# Patient Record
Sex: FEMALE | Race: WHITE | Employment: OTHER | URBAN - METROPOLITAN AREA
[De-identification: names, ages, dates, MRNs, and addresses within clinical notes are randomized per-mention and may not be internally consistent; named-entity substitution may affect disease eponyms.]

---

## 2017-01-09 ENCOUNTER — HOSPITAL ENCOUNTER (INPATIENT)
Age: 82
LOS: 4 days | Discharge: NURSING, REHAB OR LONG-TERM CARE | DRG: 069 | End: 2017-01-13
Attending: EMERGENCY MEDICINE | Admitting: INTERNAL MEDICINE
Payer: MEDICARE

## 2017-01-09 ENCOUNTER — APPOINTMENT (OUTPATIENT)
Dept: CT IMAGING | Age: 82
DRG: 069 | End: 2017-01-09
Attending: EMERGENCY MEDICINE
Payer: MEDICARE

## 2017-01-09 DIAGNOSIS — I63.9 CEREBROVASCULAR ACCIDENT (CVA), UNSPECIFIED MECHANISM (HCC): Primary | ICD-10-CM

## 2017-01-09 LAB
ANION GAP BLD CALC-SCNC: 3 MMOL/L (ref 3–18)
APTT PPP: 34.8 SEC (ref 23–36.4)
BASOPHILS # BLD AUTO: 0 K/UL (ref 0–0.06)
BASOPHILS # BLD: 1 % (ref 0–2)
BUN SERPL-MCNC: 21 MG/DL (ref 7–18)
BUN/CREAT SERPL: 17 (ref 12–20)
CALCIUM SERPL-MCNC: 9.3 MG/DL (ref 8.5–10.1)
CHLORIDE SERPL-SCNC: 104 MMOL/L (ref 100–108)
CK MB CFR SERPL CALC: 2.9 % (ref 0–4)
CK MB SERPL-MCNC: 6.6 NG/ML (ref 0.5–3.6)
CK SERPL-CCNC: 225 U/L (ref 26–192)
CO2 SERPL-SCNC: 36 MMOL/L (ref 21–32)
CREAT SERPL-MCNC: 1.21 MG/DL (ref 0.6–1.3)
DIFFERENTIAL METHOD BLD: ABNORMAL
EOSINOPHIL # BLD: 0.1 K/UL (ref 0–0.4)
EOSINOPHIL NFR BLD: 4 % (ref 0–5)
ERYTHROCYTE [DISTWIDTH] IN BLOOD BY AUTOMATED COUNT: 13.7 % (ref 11.6–14.5)
GLUCOSE SERPL-MCNC: 118 MG/DL (ref 74–99)
HCT VFR BLD AUTO: 36.4 % (ref 35–45)
HGB BLD-MCNC: 11.9 G/DL (ref 12–16)
INR PPP: 1 (ref 0.8–1.2)
LACTATE SERPL-SCNC: 0.9 MMOL/L (ref 0.4–2)
LYMPHOCYTES # BLD AUTO: 34 % (ref 21–52)
LYMPHOCYTES # BLD: 1.3 K/UL (ref 0.9–3.6)
MCH RBC QN AUTO: 31.9 PG (ref 24–34)
MCHC RBC AUTO-ENTMCNC: 32.7 G/DL (ref 31–37)
MCV RBC AUTO: 97.6 FL (ref 74–97)
MONOCYTES # BLD: 0.6 K/UL (ref 0.05–1.2)
MONOCYTES NFR BLD AUTO: 17 % (ref 3–10)
NEUTS SEG # BLD: 1.6 K/UL (ref 1.8–8)
NEUTS SEG NFR BLD AUTO: 44 % (ref 40–73)
PLATELET # BLD AUTO: 197 K/UL (ref 135–420)
PMV BLD AUTO: 10.5 FL (ref 9.2–11.8)
POTASSIUM SERPL-SCNC: 4 MMOL/L (ref 3.5–5.5)
PROTHROMBIN TIME: 12.5 SEC (ref 11.5–15.2)
RBC # BLD AUTO: 3.73 M/UL (ref 4.2–5.3)
SODIUM SERPL-SCNC: 143 MMOL/L (ref 136–145)
TROPONIN I SERPL-MCNC: 0.02 NG/ML (ref 0–0.06)
WBC # BLD AUTO: 3.7 K/UL (ref 4.6–13.2)

## 2017-01-09 PROCEDURE — 74011250637 HC RX REV CODE- 250/637: Performed by: EMERGENCY MEDICINE

## 2017-01-09 PROCEDURE — 80048 BASIC METABOLIC PNL TOTAL CA: CPT | Performed by: EMERGENCY MEDICINE

## 2017-01-09 PROCEDURE — 99285 EMERGENCY DEPT VISIT HI MDM: CPT

## 2017-01-09 PROCEDURE — 65660000000 HC RM CCU STEPDOWN

## 2017-01-09 PROCEDURE — 85730 THROMBOPLASTIN TIME PARTIAL: CPT | Performed by: EMERGENCY MEDICINE

## 2017-01-09 PROCEDURE — 83605 ASSAY OF LACTIC ACID: CPT | Performed by: EMERGENCY MEDICINE

## 2017-01-09 PROCEDURE — 85610 PROTHROMBIN TIME: CPT | Performed by: EMERGENCY MEDICINE

## 2017-01-09 PROCEDURE — 70450 CT HEAD/BRAIN W/O DYE: CPT

## 2017-01-09 PROCEDURE — 74011250636 HC RX REV CODE- 250/636: Performed by: EMERGENCY MEDICINE

## 2017-01-09 PROCEDURE — 85025 COMPLETE CBC W/AUTO DIFF WBC: CPT | Performed by: EMERGENCY MEDICINE

## 2017-01-09 PROCEDURE — 82550 ASSAY OF CK (CPK): CPT | Performed by: EMERGENCY MEDICINE

## 2017-01-09 PROCEDURE — 93005 ELECTROCARDIOGRAM TRACING: CPT

## 2017-01-09 RX ORDER — ASPIRIN 325 MG
325 TABLET ORAL
Status: COMPLETED | OUTPATIENT
Start: 2017-01-09 | End: 2017-01-09

## 2017-01-09 RX ORDER — SODIUM CHLORIDE 9 MG/ML
125 INJECTION, SOLUTION INTRAVENOUS ONCE
Status: COMPLETED | OUTPATIENT
Start: 2017-01-09 | End: 2017-01-12

## 2017-01-09 RX ADMIN — ASPIRIN 325 MG ORAL TABLET 325 MG: 325 PILL ORAL at 22:05

## 2017-01-09 RX ADMIN — SODIUM CHLORIDE 125 ML/HR: 900 INJECTION, SOLUTION INTRAVENOUS at 22:06

## 2017-01-10 LAB
APPEARANCE UR: CLEAR
ATRIAL RATE: 66 BPM
BACTERIA URNS QL MICRO: ABNORMAL /HPF
BILIRUB UR QL: NEGATIVE
CALCULATED P AXIS, ECG09: 58 DEGREES
CALCULATED R AXIS, ECG10: -34 DEGREES
CALCULATED T AXIS, ECG11: 53 DEGREES
COLOR UR: YELLOW
DIAGNOSIS, 93000: NORMAL
EPITH CASTS URNS QL MICRO: ABNORMAL /LPF (ref 0–5)
GLUCOSE BLD STRIP.AUTO-MCNC: 146 MG/DL (ref 70–110)
GLUCOSE BLD STRIP.AUTO-MCNC: 83 MG/DL (ref 70–110)
GLUCOSE UR STRIP.AUTO-MCNC: NEGATIVE MG/DL
HGB UR QL STRIP: NEGATIVE
KETONES UR QL STRIP.AUTO: NEGATIVE MG/DL
LEUKOCYTE ESTERASE UR QL STRIP.AUTO: ABNORMAL
NITRITE UR QL STRIP.AUTO: NEGATIVE
P-R INTERVAL, ECG05: 160 MS
PH UR STRIP: 6.5 [PH] (ref 5–8)
PROT UR STRIP-MCNC: NEGATIVE MG/DL
Q-T INTERVAL, ECG07: 406 MS
QRS DURATION, ECG06: 100 MS
QTC CALCULATION (BEZET), ECG08: 425 MS
RBC #/AREA URNS HPF: NEGATIVE /HPF (ref 0–5)
SP GR UR REFRACTOMETRY: 1.01 (ref 1–1.03)
UROBILINOGEN UR QL STRIP.AUTO: 0.2 EU/DL (ref 0.2–1)
VENTRICULAR RATE, ECG03: 66 BPM
WBC URNS QL MICRO: ABNORMAL /HPF (ref 0–4)

## 2017-01-10 PROCEDURE — 87077 CULTURE AEROBIC IDENTIFY: CPT | Performed by: EMERGENCY MEDICINE

## 2017-01-10 PROCEDURE — 87186 SC STD MICRODIL/AGAR DIL: CPT | Performed by: EMERGENCY MEDICINE

## 2017-01-10 PROCEDURE — 74011250636 HC RX REV CODE- 250/636: Performed by: INTERNAL MEDICINE

## 2017-01-10 PROCEDURE — 74011250636 HC RX REV CODE- 250/636: Performed by: EMERGENCY MEDICINE

## 2017-01-10 PROCEDURE — 87086 URINE CULTURE/COLONY COUNT: CPT | Performed by: EMERGENCY MEDICINE

## 2017-01-10 PROCEDURE — 74011000258 HC RX REV CODE- 258: Performed by: FAMILY MEDICINE

## 2017-01-10 PROCEDURE — 74011250636 HC RX REV CODE- 250/636: Performed by: FAMILY MEDICINE

## 2017-01-10 PROCEDURE — 81001 URINALYSIS AUTO W/SCOPE: CPT | Performed by: EMERGENCY MEDICINE

## 2017-01-10 PROCEDURE — 65660000000 HC RM CCU STEPDOWN

## 2017-01-10 PROCEDURE — 82962 GLUCOSE BLOOD TEST: CPT

## 2017-01-10 RX ORDER — SODIUM CHLORIDE 0.9 % (FLUSH) 0.9 %
5-10 SYRINGE (ML) INJECTION AS NEEDED
Status: DISCONTINUED | OUTPATIENT
Start: 2017-01-10 | End: 2017-01-11

## 2017-01-10 RX ORDER — SODIUM CHLORIDE 0.9 % (FLUSH) 0.9 %
5-10 SYRINGE (ML) INJECTION EVERY 8 HOURS
Status: DISCONTINUED | OUTPATIENT
Start: 2017-01-10 | End: 2017-01-13 | Stop reason: HOSPADM

## 2017-01-10 RX ORDER — TIMOLOL MALEATE 5 MG/ML
1 SOLUTION/ DROPS OPHTHALMIC DAILY
Status: DISCONTINUED | OUTPATIENT
Start: 2017-01-11 | End: 2017-01-13 | Stop reason: HOSPADM

## 2017-01-10 RX ORDER — MEMANTINE HYDROCHLORIDE 10 MG/1
10 TABLET ORAL DAILY
Status: DISCONTINUED | OUTPATIENT
Start: 2017-01-11 | End: 2017-01-13 | Stop reason: HOSPADM

## 2017-01-10 RX ORDER — GUAIFENESIN 100 MG/5ML
81 LIQUID (ML) ORAL DAILY
Status: DISCONTINUED | OUTPATIENT
Start: 2017-01-11 | End: 2017-01-13 | Stop reason: HOSPADM

## 2017-01-10 RX ORDER — MEMANTINE HYDROCHLORIDE 10 MG/1
10 TABLET ORAL DAILY
COMMUNITY
End: 2017-02-16

## 2017-01-10 RX ORDER — HEPARIN SODIUM 5000 [USP'U]/ML
5000 INJECTION, SOLUTION INTRAVENOUS; SUBCUTANEOUS EVERY 8 HOURS
Status: DISCONTINUED | OUTPATIENT
Start: 2017-01-10 | End: 2017-01-13 | Stop reason: HOSPADM

## 2017-01-10 RX ORDER — LANOLIN ALCOHOL/MO/W.PET/CERES
1000 CREAM (GRAM) TOPICAL DAILY
Status: DISCONTINUED | OUTPATIENT
Start: 2017-01-11 | End: 2017-01-13 | Stop reason: HOSPADM

## 2017-01-10 RX ORDER — DONEPEZIL HYDROCHLORIDE 5 MG/1
10 TABLET, FILM COATED ORAL
Status: DISCONTINUED | OUTPATIENT
Start: 2017-01-10 | End: 2017-01-13 | Stop reason: HOSPADM

## 2017-01-10 RX ORDER — CALCIUM CARBONATE 500(1250)
500 TABLET ORAL
Status: DISCONTINUED | OUTPATIENT
Start: 2017-01-11 | End: 2017-01-13 | Stop reason: HOSPADM

## 2017-01-10 RX ORDER — HALOPERIDOL 5 MG/ML
5 INJECTION INTRAMUSCULAR ONCE
Status: COMPLETED | OUTPATIENT
Start: 2017-01-10 | End: 2017-01-10

## 2017-01-10 RX ORDER — LANOLIN ALCOHOL/MO/W.PET/CERES
1000 CREAM (GRAM) TOPICAL DAILY
COMMUNITY

## 2017-01-10 RX ORDER — TIMOLOL MALEATE 6.8 MG/ML
1 SOLUTION/ DROPS OPHTHALMIC DAILY
COMMUNITY

## 2017-01-10 RX ADMIN — HALOPERIDOL LACTATE 5 MG: 5 INJECTION, SOLUTION INTRAMUSCULAR at 23:11

## 2017-01-10 RX ADMIN — SODIUM CHLORIDE 125 ML/HR: 900 INJECTION, SOLUTION INTRAVENOUS at 13:38

## 2017-01-10 RX ADMIN — CEFTRIAXONE 1 G: 1 INJECTION, POWDER, FOR SOLUTION INTRAMUSCULAR; INTRAVENOUS at 16:42

## 2017-01-10 RX ADMIN — HEPARIN SODIUM 5000 UNITS: 5000 INJECTION, SOLUTION INTRAVENOUS; SUBCUTANEOUS at 23:11

## 2017-01-10 RX ADMIN — Medication 10 ML: at 23:12

## 2017-01-10 NOTE — ED NOTES
Pt on CT table; when asked denies pain, dizziness, or other complaints at this time. CT is in progress.

## 2017-01-10 NOTE — ED NOTES
Patient resting well. Family at bedside. Patient denies pain or nausea. No complaints at this time. Will continue to monitor.

## 2017-01-10 NOTE — ED PROVIDER NOTES
HPI Comments: 9:30 PM Marianela Menendez is a 80 y.o. female with h/o dementia who presents to ED via EMS with daughter complaining of R hand weakness onset at 8:30 PM tonight. Daughter lives with patient. Per daughter she gave the patient pie this evening. Daughter states her mother could not hold a fork. She states \"My mom's R hand just went weak. \" Per daughter patient was unresponsive when asked questions. Daughter also notes a R sided facial droop. Daughter admits the symptoms have improved since arriving in the ED. Daughter denies h/o a stroke, but admits the patient fell on her face last week. Daughter denies LOC. EMS reports an Accu-Chek reading of 117. History is limited due to the patient having dementia. No other concerns nor complaints at this time. PCP: Rocael Moore MD      The history is provided by a relative and the EMS personnel. Past Medical History:   Diagnosis Date    Neurological disorder      dementia       History reviewed. No pertinent past surgical history. History reviewed. No pertinent family history. Social History     Social History    Marital status:      Spouse name: N/A    Number of children: N/A    Years of education: N/A     Occupational History    Not on file. Social History Main Topics    Smoking status: Never Smoker    Smokeless tobacco: Never Used    Alcohol use No    Drug use: No    Sexual activity: No     Other Topics Concern    Not on file     Social History Narrative         ALLERGIES: Review of patient's allergies indicates no known allergies. Review of Systems   Unable to perform ROS: Dementia       Vitals:    01/10/17 0230 01/10/17 0330 01/10/17 0400 01/10/17 0500   BP: 123/70 130/57 133/56 143/65   Pulse: 67 67 68 71   Resp: 11 13 10 12   Temp:       SpO2: 98% 98% 98% 99%   Weight:                Physical Exam   Constitutional: She appears well-developed and well-nourished. No distress.    HENT:   Head: Normocephalic and atraumatic. Right Ear: External ear normal.   Left Ear: External ear normal.   Nose: Nose normal.   Mouth/Throat: Oropharynx is clear and moist.   Eyes: Conjunctivae and EOM are normal. Pupils are equal, round, and reactive to light. No scleral icterus. Neck: Normal range of motion. Neck supple. No JVD present. No tracheal deviation present. No thyromegaly present. Cardiovascular: Normal rate, regular rhythm, normal heart sounds and intact distal pulses. Exam reveals no gallop and no friction rub. No murmur heard. Pulmonary/Chest: Effort normal and breath sounds normal. She exhibits no tenderness. Abdominal: Soft. Bowel sounds are normal. She exhibits no distension. There is no tenderness. There is no rebound and no guarding. Musculoskeletal: Normal range of motion. She exhibits no edema or tenderness. Lymphadenopathy:     She has no cervical adenopathy. Neurological: She is alert. No cranial nerve deficit. Coordination normal.   ANO X2, sensation intact, strenght 5/5,    Skin: Skin is warm and dry. Psychiatric: She has a normal mood and affect. Her behavior is normal. Judgment and thought content normal.   Nursing note and vitals reviewed.        [facility]  ED Course       Procedures    Vitals:  Patient Vitals for the past 12 hrs:   Temp Pulse Resp BP SpO2   01/10/17 0500 - 71 12 143/65 99 %   01/10/17 0400 - 68 10 133/56 98 %   01/10/17 0330 - 67 13 130/57 98 %   01/10/17 0230 - 67 11 123/70 98 %   01/10/17 0200 - 64 11 131/63 98 %   01/10/17 0130 - 65 11 138/54 99 %   01/10/17 0100 - 62 14 149/59 97 %   01/10/17 0030 - 65 17 (!) 142/96 98 %   01/10/17 0000 - 62 10 143/58 97 %   01/09/17 2300 - 62 11 135/61 97 %   01/09/17 2230 - 64 9 134/56 100 %   01/09/17 2218 - - - - 100 %   01/09/17 2205 - 66 15 146/64 100 %   01/09/17 2200 - 66 14 146/64 100 %   01/09/17 2144 97.4 °F (36.3 °C) 69 12 146/61 99 %   01/09/17 2141 - 78 11 146/61 97 %         Medications ordered:   Medications   0.9% sodium chloride infusion (125 mL/hr IntraVENous New Bag 1/9/17 2206)   aspirin (ASPIRIN) tablet 325 mg (325 mg Oral Given 1/9/17 2205)         Lab findings:  Recent Results (from the past 12 hour(s))   PROTHROMBIN TIME + INR    Collection Time: 01/09/17  9:47 PM   Result Value Ref Range    Prothrombin time 12.5 11.5 - 15.2 sec    INR 1.0 0.8 - 1.2     PTT    Collection Time: 01/09/17  9:47 PM   Result Value Ref Range    aPTT 34.8 23.0 - 36.4 SEC   LACTIC ACID, PLASMA    Collection Time: 01/09/17  9:47 PM   Result Value Ref Range    Lactic acid 0.9 0.4 - 2.0 MMOL/L   CBC WITH AUTOMATED DIFF    Collection Time: 01/09/17  9:47 PM   Result Value Ref Range    WBC 3.7 (L) 4.6 - 13.2 K/uL    RBC 3.73 (L) 4.20 - 5.30 M/uL    HGB 11.9 (L) 12.0 - 16.0 g/dL    HCT 36.4 35.0 - 45.0 %    MCV 97.6 (H) 74.0 - 97.0 FL    MCH 31.9 24.0 - 34.0 PG    MCHC 32.7 31.0 - 37.0 g/dL    RDW 13.7 11.6 - 14.5 %    PLATELET 938 766 - 553 K/uL    MPV 10.5 9.2 - 11.8 FL    NEUTROPHILS 44 40 - 73 %    LYMPHOCYTES 34 21 - 52 %    MONOCYTES 17 (H) 3 - 10 %    EOSINOPHILS 4 0 - 5 %    BASOPHILS 1 0 - 2 %    ABS. NEUTROPHILS 1.6 (L) 1.8 - 8.0 K/UL    ABS. LYMPHOCYTES 1.3 0.9 - 3.6 K/UL    ABS. MONOCYTES 0.6 0.05 - 1.2 K/UL    ABS. EOSINOPHILS 0.1 0.0 - 0.4 K/UL    ABS.  BASOPHILS 0.0 0.0 - 0.06 K/UL    DF AUTOMATED     METABOLIC PANEL, BASIC    Collection Time: 01/09/17  9:47 PM   Result Value Ref Range    Sodium 143 136 - 145 mmol/L    Potassium 4.0 3.5 - 5.5 mmol/L    Chloride 104 100 - 108 mmol/L    CO2 36 (H) 21 - 32 mmol/L    Anion gap 3 3.0 - 18 mmol/L    Glucose 118 (H) 74 - 99 mg/dL    BUN 21 (H) 7.0 - 18 MG/DL    Creatinine 1.21 0.6 - 1.3 MG/DL    BUN/Creatinine ratio 17 12 - 20      GFR est AA 50 (L) >60 ml/min/1.73m2    GFR est non-AA 42 (L) >60 ml/min/1.73m2    Calcium 9.3 8.5 - 10.1 MG/DL   CARDIAC PANEL,(CK, CKMB & TROPONIN)    Collection Time: 01/09/17  9:47 PM   Result Value Ref Range     (H) 26 - 192 U/L    CK - MB 6.6 (H) 0.5 - 3.6 ng/ml    CK-MB Index 2.9 0.0 - 4.0 %    Troponin-I, Qt. 0.02 0.00 - 0.06 NG/ML   EKG, 12 LEAD, INITIAL    Collection Time: 01/09/17  9:58 PM   Result Value Ref Range    Ventricular Rate 66 BPM    Atrial Rate 66 BPM    P-R Interval 160 ms    QRS Duration 100 ms    Q-T Interval 406 ms    QTC Calculation (Bezet) 425 ms    Calculated P Axis 58 degrees    Calculated R Axis -34 degrees    Calculated T Axis 53 degrees    Diagnosis       Normal sinus rhythm  Left axis deviation  Nonspecific ST abnormality  Abnormal ECG  When compared with ECG of 16-JAN-2014 09:10,  No significant change was found             X-Ray, CT or other radiology findings or impressions:  CT HEAD WO CONT   Final Result   No acute hemorrhage. Atrophy and findings of chronic microvascular disease. Read by radiology     Progress notes, Consult notes or additional Procedure notes:   Consult:  Discussed care with Dr. Atul De Luna, teleneurology. Standard discussion; including history of patients chief complaint, available diagnostic results, and treatment course. Will evaluate patient. 9:40 PM    Consult:  Discussed care with CT tech. Standard discussion; including history of patients chief complaint, available diagnostic results, and treatment course. CT showed no acute disease. 9:46 PM    Consult:  Discussed care with Dr. Atul De Luna, teleneurology. Standard discussion; including history of patients chief complaint, available diagnostic results, and treatment course. Pt will be given Aspirin. Patient's symptoms have seem to improved. Patient will be admitted for observation. 9:50 PM    Consult:  Discussed care with Dr. Arlene Wyatt, hospitalist. Standard discussion; including history of patients chief complaint, available diagnostic results, and treatment course. Agrees to admit patient. 10:08 PM    On recheck, pt remains at baseline per family    44:53 AM d./w dr Nick Liu, neuro, to consult  12:29 AM Daughter reports Pt is at her baseline. Disposition:  Diagnosis:   1. Cerebrovascular accident (CVA), unspecified mechanism ([facility] [address]        Disposition: admitted    Follow-up Information     None           Patient's Medications   Start Taking    No medications on file   Continue Taking    ASPIRIN 81 MG CHEWABLE TABLET    Take 1 Tab by mouth daily. DONEPEZIL (ARICEPT) 10 MG TABLET    Take 10 mg by mouth nightly. 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      Hermes Robert MD (01/09/17/ 9:30 PM)    Signed by: Bj Vega, 01/09/17, 9:30 PM

## 2017-01-10 NOTE — ED NOTES
Bedside and Verbal shift change report given to Honorio Castañeda RN (oncoming nurse) by Rhys Bowman RN (offgoing nurse). Report included the following information SBAR, ED Summary, Intake/Output, MAR and Recent Results.

## 2017-01-10 NOTE — ED NOTES
Pt is awake/alert; affect is calm, able to identify herself but confused to . Pt demonstrates no facial droop and is able to speak without difficulty/maintain airway and oral secretions without difficulty. Pt able to LARA x 4;  and foot extension/dorsiflexion weaker on right side. Able to attempt heel to shin test on both sides; difficulty bending her right knee which is baseline per family. Pt does touch her nose but does not comprehend/does not touch finger of evaluating staff member. Able to partially identify scenario pictures but cannot fully describe events occurring in pictures. Able to read single words and sentences without any difficulty. Primary RN Sergio Garza providing care.

## 2017-01-10 NOTE — ED NOTES
Patient has no complaints at this time. Patient symptoms have greatly improved. Patient family states that she seems even better than her normal baseline. Patient able to lift right leg without drifting at this NIH scale, and also was able to touch nose to finger without much difficulty. Patient and family understands plan of care. Will continue to monitor.

## 2017-01-10 NOTE — ED NOTES
Patient resting well. Patient has some right sided weakness that patient's family states is new to patient. Patient answers questions appropriately, but appears confused when asked some questions. Patient family states that this is normal cognition for patient. EMS states that on arrival paint had right sided facial drooping and would not respond to questions. Family states that patient was eating pie when she became unresponsive for 4 minutes, and then came to with facial drooping. Facial drooping has been resolved PTA. Patient and family understands plan of care. Will continue to monitor.

## 2017-01-10 NOTE — ED NOTES
Patient family states that patient is now at baseline cognition, movement and strength. Patient denies pain or nausea. Patient resting well. Will continue to monitor.

## 2017-01-10 NOTE — ED NOTES
Patient had urinary incontinence. Family states that this is normal for her and that she wears depends at home. Patient cleaned, changed into new depends, and repositioned for comfort. Patient denies pain at this time. Will continue to monitor.

## 2017-01-11 LAB
ANION GAP BLD CALC-SCNC: 8 MMOL/L (ref 3–18)
BASOPHILS # BLD AUTO: 0 K/UL (ref 0–0.1)
BASOPHILS # BLD: 0 % (ref 0–2)
BUN SERPL-MCNC: 13 MG/DL (ref 7–18)
BUN/CREAT SERPL: 14 (ref 12–20)
CALCIUM SERPL-MCNC: 9.2 MG/DL (ref 8.5–10.1)
CHLORIDE SERPL-SCNC: 107 MMOL/L (ref 100–108)
CHOLEST SERPL-MCNC: 207 MG/DL
CO2 SERPL-SCNC: 28 MMOL/L (ref 21–32)
CREAT SERPL-MCNC: 0.94 MG/DL (ref 0.6–1.3)
DIFFERENTIAL METHOD BLD: ABNORMAL
EOSINOPHIL # BLD: 0.1 K/UL (ref 0–0.4)
EOSINOPHIL NFR BLD: 2 % (ref 0–5)
ERYTHROCYTE [DISTWIDTH] IN BLOOD BY AUTOMATED COUNT: 13.5 % (ref 11.6–14.5)
EST. AVERAGE GLUCOSE BLD GHB EST-MCNC: 111 MG/DL
GLUCOSE BLD STRIP.AUTO-MCNC: 106 MG/DL (ref 70–110)
GLUCOSE BLD STRIP.AUTO-MCNC: 89 MG/DL (ref 70–110)
GLUCOSE BLD STRIP.AUTO-MCNC: 93 MG/DL (ref 70–110)
GLUCOSE SERPL-MCNC: 89 MG/DL (ref 74–99)
HBA1C MFR BLD: 5.5 % (ref 4.2–5.6)
HCT VFR BLD AUTO: 32.6 % (ref 35–45)
HDLC SERPL-MCNC: 134 MG/DL (ref 40–60)
HDLC SERPL: 1.5 {RATIO} (ref 0–5)
HGB BLD-MCNC: 11 G/DL (ref 12–16)
LDLC SERPL CALC-MCNC: 64.6 MG/DL (ref 0–100)
LIPID PROFILE,FLP: ABNORMAL
LYMPHOCYTES # BLD AUTO: 37 % (ref 21–52)
LYMPHOCYTES # BLD: 1.7 K/UL (ref 0.9–3.6)
MCH RBC QN AUTO: 32.3 PG (ref 24–34)
MCHC RBC AUTO-ENTMCNC: 33.7 G/DL (ref 31–37)
MCV RBC AUTO: 95.6 FL (ref 74–97)
MONOCYTES # BLD: 0.7 K/UL (ref 0.05–1.2)
MONOCYTES NFR BLD AUTO: 15 % (ref 3–10)
NEUTS SEG # BLD: 2.1 K/UL (ref 1.8–8)
NEUTS SEG NFR BLD AUTO: 46 % (ref 40–73)
PLATELET # BLD AUTO: 185 K/UL (ref 135–420)
PMV BLD AUTO: 10.7 FL (ref 9.2–11.8)
POTASSIUM SERPL-SCNC: 3.8 MMOL/L (ref 3.5–5.5)
RBC # BLD AUTO: 3.41 M/UL (ref 4.2–5.3)
SODIUM SERPL-SCNC: 143 MMOL/L (ref 136–145)
TRIGL SERPL-MCNC: 42 MG/DL (ref ?–150)
VLDLC SERPL CALC-MCNC: 8.4 MG/DL
WBC # BLD AUTO: 4.6 K/UL (ref 4.6–13.2)

## 2017-01-11 PROCEDURE — 65660000000 HC RM CCU STEPDOWN

## 2017-01-11 PROCEDURE — 85025 COMPLETE CBC W/AUTO DIFF WBC: CPT | Performed by: FAMILY MEDICINE

## 2017-01-11 PROCEDURE — 97530 THERAPEUTIC ACTIVITIES: CPT

## 2017-01-11 PROCEDURE — 97161 PT EVAL LOW COMPLEX 20 MIN: CPT

## 2017-01-11 PROCEDURE — 74011000250 HC RX REV CODE- 250: Performed by: FAMILY MEDICINE

## 2017-01-11 PROCEDURE — 74011250636 HC RX REV CODE- 250/636: Performed by: INTERNAL MEDICINE

## 2017-01-11 PROCEDURE — 80061 LIPID PANEL: CPT | Performed by: FAMILY MEDICINE

## 2017-01-11 PROCEDURE — 74011250636 HC RX REV CODE- 250/636: Performed by: FAMILY MEDICINE

## 2017-01-11 PROCEDURE — 74011250637 HC RX REV CODE- 250/637: Performed by: INTERNAL MEDICINE

## 2017-01-11 PROCEDURE — 74011250637 HC RX REV CODE- 250/637: Performed by: FAMILY MEDICINE

## 2017-01-11 PROCEDURE — 36415 COLL VENOUS BLD VENIPUNCTURE: CPT | Performed by: FAMILY MEDICINE

## 2017-01-11 PROCEDURE — 80048 BASIC METABOLIC PNL TOTAL CA: CPT | Performed by: FAMILY MEDICINE

## 2017-01-11 PROCEDURE — 97165 OT EVAL LOW COMPLEX 30 MIN: CPT

## 2017-01-11 PROCEDURE — 92610 EVALUATE SWALLOWING FUNCTION: CPT

## 2017-01-11 PROCEDURE — 82962 GLUCOSE BLOOD TEST: CPT

## 2017-01-11 PROCEDURE — 83036 HEMOGLOBIN GLYCOSYLATED A1C: CPT | Performed by: FAMILY MEDICINE

## 2017-01-11 PROCEDURE — 74011000258 HC RX REV CODE- 258: Performed by: FAMILY MEDICINE

## 2017-01-11 RX ORDER — LORAZEPAM 2 MG/ML
1 INJECTION INTRAMUSCULAR
Status: DISCONTINUED | OUTPATIENT
Start: 2017-01-11 | End: 2017-01-12

## 2017-01-11 RX ORDER — ATORVASTATIN CALCIUM 40 MG/1
40 TABLET, FILM COATED ORAL
Status: DISCONTINUED | OUTPATIENT
Start: 2017-01-11 | End: 2017-01-13

## 2017-01-11 RX ADMIN — HEPARIN SODIUM 5000 UNITS: 5000 INJECTION, SOLUTION INTRAVENOUS; SUBCUTANEOUS at 11:37

## 2017-01-11 RX ADMIN — Medication 500 MG: at 11:37

## 2017-01-11 RX ADMIN — Medication 10 ML: at 21:04

## 2017-01-11 RX ADMIN — Medication 10 ML: at 15:01

## 2017-01-11 RX ADMIN — Medication 1 CAPSULE: at 08:34

## 2017-01-11 RX ADMIN — Medication 500 MG: at 08:39

## 2017-01-11 RX ADMIN — DONEPEZIL HYDROCHLORIDE 10 MG: 5 TABLET, FILM COATED ORAL at 21:03

## 2017-01-11 RX ADMIN — Medication 10 ML: at 06:41

## 2017-01-11 RX ADMIN — TIMOLOL MALEATE 1 DROP: 5 SOLUTION OPHTHALMIC at 09:12

## 2017-01-11 RX ADMIN — CEFTRIAXONE 1 G: 1 INJECTION, POWDER, FOR SOLUTION INTRAMUSCULAR; INTRAVENOUS at 17:57

## 2017-01-11 RX ADMIN — Medication 500 MG: at 17:57

## 2017-01-11 RX ADMIN — HEPARIN SODIUM 5000 UNITS: 5000 INJECTION, SOLUTION INTRAVENOUS; SUBCUTANEOUS at 21:03

## 2017-01-11 RX ADMIN — ASPIRIN 81 MG CHEWABLE TABLET 81 MG: 81 TABLET CHEWABLE at 08:35

## 2017-01-11 RX ADMIN — LORAZEPAM 1 MG: 2 INJECTION INTRAMUSCULAR; INTRAVENOUS at 23:10

## 2017-01-11 RX ADMIN — HEPARIN SODIUM 5000 UNITS: 5000 INJECTION, SOLUTION INTRAVENOUS; SUBCUTANEOUS at 06:41

## 2017-01-11 RX ADMIN — MEMANTINE 10 MG: 10 TABLET ORAL at 08:35

## 2017-01-11 RX ADMIN — CYANOCOBALAMIN TAB 1000 MCG 1000 MCG: 1000 TAB at 08:34

## 2017-01-11 RX ADMIN — ATORVASTATIN CALCIUM 40 MG: 40 TABLET, FILM COATED ORAL at 21:03

## 2017-01-11 NOTE — PROGRESS NOTES
Problem: Mobility Impaired (Adult and Pediatric)  Goal: *Acute Goals and Plan of Care (Insert Text)  Physical Therapy Goals  Initiated 1/11/2017 and to be accomplished within 7 day(s)  1. Patient will move from supine to sit and sit to supine in bed with minimal assistance. 2. Patient will transfer from bed to chair and chair to bed with minimal assistance using the least restrictive device. 3. Patient will perform sit to stand with minimal assistance. 4. Patient will ambulate with minimal assistance for 50 feet with the least restrictive device. 5. Patient will tolerate sitting EOB 5-10 minutes with Fair to Good balance in order to perform TE.  6. Patient will tolerate sitting up in bedside chair for 1-2 hour/day to improve respiratory capacity. PHYSICAL THERAPY EVALUATION     Patient: Cj Zaragoza (7 y.o. female)  Date: 1/11/2017  Primary Diagnosis: CVA (cerebral vascular accident) [facility])        Precautions: Fall         ASSESSMENT :  Pt is 79yo F admitted to hospital for CVA; CT negative, awaiting MRI. Pt presents alert, agreeable to therapy, but confused. Pt's daughter is present to assist with history/PLOF. Pt transferred sup to sit with Kait and increased time then required ModA to scoot to EOB. Pt stood 1st trial for 15s, 2nd trial was for 30s as was 3rd standing trial. Pt demonstrated crouched posture and is unable to maintain feet under WILLIAM. Pt demonstrates decreased carryover for technique and initiation (to bring hand to walker from bed i.e.) With standing pt repeating, \"Don't let me go yet\" as she is anxious about falling. After 3rd standing trial pt transferred back into bed with ModA then rolled in bed with Kait to change ace pads 2/2 to incontinence of bladder. Pt daughter reports it's been getting harder for her to get around and she's been having increased anxiety about falling at home.  Though pt states she feel her sx have improved from when she arrived at ED, pt currently demonstrates decreased strength, mobility, and endurance and will benefit from skilled intervention to address the above impairments. Patients rehabilitation potential is considered to be Fair  Factors which may influence rehabilitation potential include:   [ ]         None noted  [X]         Mental ability/status  [X]         Medical condition  [X]         Home/family situation and support systems  [X]         Safety awareness  [X]         Pain tolerance/management  [ ]         Other:        PLAN :  Recommendations and Planned Interventions:  [X]           Bed Mobility Training             [X]    Neuromuscular Re-Education  [X]           Transfer Training                   [ ]    Orthotic/Prosthetic Training  [X]           Gait Training                          [ ]    Modalities  [X]           Therapeutic Exercises          [ ]    Edema Management/Control  [X]           Therapeutic Activities            [X]    Patient and Family Training/Education  [ ]           Other (comment):     Frequency/Duration: Patient will be followed by physical therapy 1-2 times per day/4-7 days per week to address goals. Discharge Recommendations: Joaquín Quesada  Further Equipment Recommendations for Discharge: N/A      . Eval Complexity: History: MEDIUM  Complexity : 1-2 comorbidities / personal factors will impact the outcome/ POC Exam:MEDIUM Complexity : 3 Standardized tests and measures addressing body structure, function, activity limitation and / or participation in recreation  Presentation: LOW Complexity : Stable, uncomplicated  Overall Complexity:LOW       G-CODES       Mobility  Current  CL= 60-79%   Goal  CJ= 20-39%. The severity rating is based on the Level of Assistance required for Functional Mobility and ADLs. SUBJECTIVE:   Patient agreeable to therapy. OBJECTIVE DATA SUMMARY:       Past Medical History   Diagnosis Date    Neurological disorder         dementia   History reviewed.  No pertinent past surgical history. Prior Level of Function/Home Situation: Pt lives with retired daughter in 1 story home with 4STE. Pt was able to dress and bathe herself and was walking in the home with [facility]. Pt's daughter states it was getting to be more difficult for her to move around at home. Home Situation  Home Environment: Private residence  One/Two Story Residence: One story  Living Alone: No  Support Systems: Child(marie)  Patient Expects to be Discharged to[de-identified] Private residence  Current DME Used/Available at Home: esperanza Gracia  Critical Behavior:  Neurologic State: Alert  Orientation Level: Oriented to person  Cognition: Follows commands   Psychosocial  Purposeful Interaction: Yes  Pt Identified Daily Priority: Clinical issues (comment)  Caritas Process: Nurture loving kindness  Caring Interventions: Therapeutic modalities  Reassure: Informing; Acceptance  Therapeutic Modalities: Guided Imagery channel ([address] [facility] [address] only)   Strength:    Strength: Generally decreased, functional   RLE4-/5 throughout, LLE 4/4 throughout   Tone & Sensation:   Tone: Normal   Sensation: Intact (BLE to LT)   Range Of Motion:  AROM: Generally decreased, functional   Functional Mobility:  Bed Mobility:   Supine to Sit: Minimum assistance  Sit to Supine: Moderate assistance   Transfers:  Sit to Stand: Maximum assistance (x3 trials; 1st trial 15s, 2nd trial 30s, 3rd trial 30s)  Stand to Sit: Maximum assistance   PT blocked R foot from sliding from under her WILLIAM despite pt wearing non slip socks and blocking; pt unable to come out of crouched posture. Balance:   Sitting: Intact; With support  Standing: Impaired; With support  Standing - Static: Poor  Ambulation/Gait Training:    Unable to attempt as unsafe at this time due to pt's increased assist requirements. Pain:  Pt reports 0/10 pain or discomfort prior to treatment. Pt reports 0/10 pain or discomfort post treatment.       Activity Tolerance:   Pt demonstrates decreased endurance and mobility as evidenced by functional deficits compared to baseline. Please refer to the flowsheet for vital signs taken during this treatment. After treatment:   [ ]         Patient left in no apparent distress sitting up in chair  [X]         Patient left in no apparent distress in bed  [X]         Call bell left within reach  [ ]         Nursing notified  [X]         Caregiver present  [X]         Bed alarm activated      COMMUNICATION/EDUCATION:   [X]         Fall prevention education was provided and the patient/caregiver indicated understanding. [X]         Patient/family have participated as able in goal setting and plan of care. [X]         Patient/family agree to work toward stated goals and plan of care. [ ]         Patient understands intent and goals of therapy, but is neutral about his/her participation. [ ]         Patient is unable to participate in goal setting and plan of care.      Thank you for this referral.  Jose Alfredo Mccarthy, PT   Time Calculation: 23 mins

## 2017-01-11 NOTE — PROGRESS NOTES
Interdisciplinary Round Note   Patient Information: Patient Information: Debra Torre                                      470/32   Reason for Admission: CVA (cerebral vascular accident) [facility])   Attending Provider:   Kim Gonzalez MD  Primary Care Physician:       Ralf Iqbal MD       [phone]   Past Medical History:   Past Medical History   Diagnosis Date    Neurological disorder      dementia      Hospital day: 2  Estimated discharge date: tbd  RRAT Score: Medium Risk            18       Total Score        3 Relationship with PCP    4 More than 1 Admission in calendar year    5 Patient Insurance is Medicare, Medicaid or Self Pay    6 Charlson Comorbidity Score        Criteria that do not apply:    Patient Living Status    Patient Length of Stay > 5       Goals for Today: safety , neuro check       Overnight Events:      VITAL SIGNS  Vitals:    01/11/17 0200 01/11/17 0400 01/11/17 0800 01/11/17 1200   BP: 135/69 138/71 151/82 146/53   Pulse: 67 76 78 78   Resp: 18 18 19 19   Temp: 97.6 °F (36.4 °C) 97.6 °F (36.4 °C) 97.8 °F (36.6 °C) 98.2 °F (36.8 °C)   SpO2: 96% 95% 96% 95%   Weight:       Height:              Lines, Drains, & Airways    Peripheral IV 01/09/17 Right Antecubital-Site Assessment: Clean, dry, & intact       VTE Prophylaxis                                   Intake and Output:      01/11 0701 - 01/11 1900  In: 240 [P.O.:240]  Out: -  Current Diet: DIET REGULAR          GI Prophylaxis: yes        Type:         Recent Glucose Results:   Lab Results   Component Value Date/Time    GLU 89 01/11/2017 04:00 AM    GLUCPOC 93 01/11/2017 11:20 AM    GLUCPOC 89 01/11/2017 07:34 AM    GLUCPOC 146 (H) 01/10/2017 09:01 PM          IV Antibiotics? When started: Activity Level: Activity Level: Up with Assistance  Needs assistance with ADLs: yes  PT Consult Status:  Current Immunizations: There is no immunization history on file for this patient.        Recommendations: Discharge Disposition: TBD, pending progress    Needs for Discharge:  Recommendations from IDR team: continue ith current plan of care     Other Notes:

## 2017-01-11 NOTE — PROGRESS NOTES
Occupational Therapy Note: Orders received, chart reviewed and this patient was evaluated this morning and discharged from OT. Deferring her functional mobility needs to PT. Feel she will be able to complete her routine re-establishment with OT in her familiar environment or an environment designed for routines.  Pedro Sidhu, OTR/L

## 2017-01-11 NOTE — ROUTINE PROCESS
Bedside and Verbal shift change report given to RUSTY Lee  (oncoming nurse) by Lucita Yost RN  (offgoing nurse). Report included the following information SBAR, Kardex, ED Summary, Procedure Summary, Intake/Output, MAR and Recent Results.

## 2017-01-11 NOTE — ROUTINE PROCESS
Family member had already given pt meds.   Instructed family member let hospital medicate pt from this point forward

## 2017-01-11 NOTE — PROGRESS NOTES
Problem: Self Care Deficits Care Plan (Adult)  Goal: *Acute Goals and Plan of Care (Insert Text)  Occupational Therapy Goals  Initiated 1/11/2017 within 1 day(s). 1. Patients daughter will relate validation therapy to calming patient in preparation for her efficient completion of her self care  Outcome: Progressing Towards Goal  OCCUPATIONAL THERAPY EVALUATION/DISCHARGE     Patient: Vijaya Shah (84 y.o. female)  Date: 1/11/2017  Primary Diagnosis: CVA (cerebral vascular accident) [facility])   Precautions: fall         ASSESSMENT AND RECOMMENDATIONS:  Based on the objective data described below, the patient presents with functional UB strength, decreased functional mobility and baseline dementia that appears stressful for her daughter. This patient responds well to validation therapy interaction and this was instructed to this patient's daughter as well as demonstrated. Her daughter is minimally receptive and references her mother's stubbornness and high desire to be independent as limiting factors. Skilled acute care occupational therapy is not indicated at this time. Feel she might benefit from home health OT for routine re-establishment in her familiar environment. Discharge Recommendations: Home Health and 24 hour supervision  Further Equipment Recommendations for Discharge: N/A       G-CODES:   Self Care  Current  CK= 40-59%   Goal  CK= 40-59%   D/C  CK= 40-59%. The severity rating is based on the Level of Assistance required for Functional Mobility and ADLs. Eval Complexity: History: LOW Complexity : Brief history review ; Examination: MEDIUM Complexity : 3-5 performance deficits relating to physical, cognitive , or psychosocial skils that result in activity limitations and / or participation restrictions; Decision Making:MEDIUM Complexity : Patient may present with comorbidities that affect occupational performnce.  Miniml to moderate modification of tasks or assistance (eg, physical or verbal ) with assesment(s) is necessary to enable patient to complete evaluation  complexity summary: LOW complexity      SUBJECTIVE:   Patient stated I play bridge at [facility].  (she is referring to her adult day care program) her daughter stating: \" she is not an emotional person\" as she was declining to follow validation therapy approach. Redirection was attempted and she was minimally receptive following that. OBJECTIVE DATA SUMMARY:       Past Medical History   Diagnosis Date    Neurological disorder         dementia   History reviewed. No pertinent past surgical history.   Prior Level of Function/Home Situation: she lives with one daughter and another daughter assists as her work schedule allows  [address]: Private residence  [address] [place] [address] St: One story  Living Alone: No  Support Systems: Child(marie)  Patient Expects to be Discharged to[de-identified] Private residence  Current DME Used/Available at Home: Fadia Cho, rolling  [X]     Right hand dominant       [ ]     Left hand dominant  Cognitive/Behavioral Status:   she is alert, oriented X 1; she is easily oriented to place and situation using validation therapy   She has poor short term memory and poor immediate recall   Skin: no skin integrity issues noted (she has a bruise on her right eye secondary to a recent fall)  Edema: no extremity edema noted  Vision/Perceptual:      tracking is WFL, left eye is ABDucted and elevated although she is not reporting double vision or blurry vision    Coordination:   SHINE's WFL   Balance:   mod assist to come to a stand without a device and facilitating her automatic response to movement (she bristles at being told what to do and benefits from acknowledging her stress at that and then being re-directed)  Strength:  BUCHAPITO's: 4/5 (symmetrical)   Tone & Sensation:  BUCHAPITO's WFL   Range of Motion:  BUCHAPITO's AROM is [facility]   Functional Mobility and Transfers for ADLs:  Bed Mobility:   supine to sit and return to supine requires min to mod assist   Transfers:   mod assist/min assist   ADL Assessment:   she requires total assistance for LB self care (secondary to decreased ability to bend her knees and sensitivity of her LE's related to varicose veins per her daughter). UB bathing/dressing: she is physically able to complete these tasks although she requires cues to stay on task and for thoroughness. Self feeding: she is unable to simulated (she is not able to complete out of context tasks secondary to her baseline dementia)  Grooming: she requires assistance secondary to decreased standing this morning and anticipate assistance for thoroughness secondary to her decreased attention and poor STM)  ADL Intervention:   Recommendations made to her daughter that she remove her cane from her environment in order to facilitate her consistent use of her walker at home. Her daughter reports she is using her cane as a grabber (reacher). She was recommended to acquire a reacher for her and leave it where she sits during the day. Places to acquire a reacher and reacher styles were recommended. Her daughter reports an understanding. Her daughter reports being frustrated that the patient does not use the \"clap on clap off\" system for using the lights in her room and feels it is leading to her falling. A light sensor was recommended secondary to this patient's poor STM does not allow her to pull forward the memory of clapping to turn the lights on in her room. Her daughter reports an understanding and that she will follow through. Pain:  0/10 pain prior to OT session  0/10 pain following OT session  Activity Tolerance:   No SOB noted; she is reporting generalized fatigue  Please refer to the flowsheet for vital signs taken during this treatment.   After treatment:   [ ]  Patient left in no apparent distress sitting up in chair  [X]  Patient left in no apparent distress in bed  [X]  Call bell left within reach  [ ]  Nursing notified  [ ]  Caregiver present  [ ]  Bed alarm activated      COMMUNICATION/EDUCATION:   Communication/Collaboration:  [ ]      Home safety education was provided and the patient/caregiver indicated understanding. [X]      Patient's family have participated as able and agree with findings and recommendations. [X]      Patient is unable to participate in plan of care at this time.      Randie Gitelman, OTR/L  Time Calculation: 24 mins

## 2017-01-11 NOTE — H&P
[facility]  ROUTINE H AND PS    Name:  Daron Weaver  MR#:  [id number]  :  1924  Account #:  [de-identified]  Date of Adm:  2017      CHIEF COMPLAINT: Right arm weakness, right facial droop, garbled  speech. HISTORY OF PRESENT ILLNESS: Please note this history is  obtained by the patient's daughter, who was present at bedside. The  patient cannot provide anything in the way of history due to her  dementia. The patient is a 80-year-old female with a past medical history  significant for dementia, who presented to the [facility] Emergency  Department because of right arm weakness, right facial droop and  some garbled speech. Last night the patient was given some pie to  eat, but the patient starts to try to eat this with her hand, seemed to be  confused, had garbled speech. She was noted to have right facial  droop, and could not move her right arm due to weakness. There is no history of prior CVA. She takes an aspirin every day. She  does have a history of lower extremity DVT, which was related to  trauma and previously had been on Coumadin and Xarelto, which she  completed courses of. She does have significant fall history. She uses a walker, but is very  unsteady on her feet. The patient otherwise is at baseline now. The patient denies any pain,  shortness of breath, chest pain, weakness. REVIEW OF SYSTEMS: Unable to fully obtain from patient due to  condition. She denies all questions on review. ALLERGIES: NO KNOWN DRUG ALLERGIES. MEDICATIONS:  1. Aspirin 81 mg p.o. daily. 2. Aricept 10 mg p.o. in the evening. 3. Namenda 10 mg p.o. daily. 4. Multivitamin p.o. daily. 5. Mylanta 400 mg p.o. t.i.d. with meals. 6. Fish oil cap 1000 mg p.o. daily. 7. Vitamin B12 1000 mcg p.o. daily. 8. Timolol ophthalmic solution 0.5% one drop, right eye, daily. PAST MEDICAL HISTORY:  1. Dementia. 2. History of DVT related to trauma.     PAST SURGICAL HISTORY: None.    FAMILY HISTORY: Denies. SOCIAL HISTORY: No alcohol, tobacco or any other drug use. CODE STATUS: Daughter relates that the patient is DNR. PHYSICAL EXAMINATION:  VITAL SIGNS: Last set of vitals includes a temperature of 98.2, pulse  74, pressure of 124/79, respiratory rate 22, saturating at 97% on room  air. GENERAL: The patient is a well-developed, well-nourished female  who is awake, alert, and in no distress. The daughter is present at  bedside. HEENT: Head is normocephalic and atraumatic. Pupils are equal,  round and reactive to light. Extraocular motion intact. Nares patent,  both sides. Posterior pharynx is clear. Mucous membranes are moist.  Tongue is midline. NECK: Supple, no lymphadenopathy. CARDIOVASCULAR: Regular rate and rhythm. No murmur, rubs, or  gallops. PULMONARY: Clear to auscultation bilaterally. ABDOMEN: Soft, nontender, nondistended with normal bowel sounds. No masses are felt. EXTREMITIES: 5/5 motor strength x4. No calf tenderness, no edema. NEUROLOGIC: Cranial nerves 2 through 12 grossly intact. LABORATORIES: Include a metabolic panel with a sodium of 143,  potassium 4.0, chloride 104, CO2 of 36, BUN 21, creatinine 1.21,  glucose 118, calcium 9.3    Lactic acid 0.9. CPK of 225, index 2.9, MB 6.6, troponin-I of 0.02. CBC with a white count of 3.7, hemoglobin/hematocrit of 11.9/36.4,  platelets of 953. Differential: 44 segs, 34 lymphs, 17 mono's, 4 eo's, 1  basophil. PT/INR of 12.5/1.0, APTT of 34.8. Urinalysis: Large leukocyte esterase, negative nitrites, 4-10 WBCs, 4+  bacteria. Urine cultures have been drawn and sent. She had a CT of her head by report showing no acute hemorrhage. Atrophy and findings of chronic microvascular disease. EKG has been reviewed and on my read shows normal sinus rhythm,  rate of 66, minimally depressed/inverted T-wave in V1.     IMPRESSION: This is a 77-year-old female with a past history  significant for the above, who is here with possible acute  cerebrovascular accident versus transient ischemic attack. PLAN:  1. Possible acute CVA versus TIA: Will admit under protocol. Will  maintain on a daily aspirin. Case was discussed with the patient's  daughter with regard to other antiplatelet therapies. Given her fall risk,  we are hesitant to initiate any strong antiplatelet therapy. MRI has  been ordered. Maintain permissive hypertension. Neurology has been  consulted by the ER physician. Will follow up on their  recommendations. 2. Acute metabolic encephalopathy, transient: Seems to be at baseline  now. Does have a history of dementia. May be related to possible UTI  as well. Will monitor clinically. 3. Urinary tract infection: Based on urinalysis. Will follow urine culture  results. Given presenting complaints of some confusion, will go ahead  and treat her with Rocephin. Follow urine culture results and transition  over to p.o. medications as needed. 4. Dementia: Continue outpatient medication regimen. 5. DVT prophylaxis in the form of heparin 5000 units subcu every 8  hours.         Lisandro Sarmiento MD PP / Ben Lopez  D:  01/10/2017   19:11  T:  01/10/2017   21:43  Job #:  [id number]

## 2017-01-11 NOTE — PROGRESS NOTES
The patient is 80years old who presents with intermittent dysarthria and right arm weakness X 2 on Monday and Tuesday. She was assessed by tele neurology, though family declined tPA. Since she has been in the hospital daughters have noticed she is intermittently confused and her sleep cycle is off. On her neurological exam she is alert and oriented to person and place. Speech is fluent without signs of dysphasia or dysarthria. She names objects well. Digit span is ok. She knows Michelle Sanchez is the President though had trouble remembering the Bushes. Recall was 0/3 at 3 minutes. Visual fields and eye movements are full. Facial movements and sensation normal. Palatal and pharyngeal movements and reflexes are symmetric. Tongue is midline. Finger to nose and finger to finger movements are normal. Strength is 5/5 in all extremities. DTRs are symmetric. Gait not tested. Exam at present confirms dementia and apparent reports of sundowning. There are no focal deficits. MRI is pending.

## 2017-01-11 NOTE — PROGRESS NOTES
Problem: Dysphagia (Adult)  Goal: *Acute Goals and Plan of Care (Insert Text)  Patient will:  1. Tolerate PO trials with 0 s/s overt distress in 4/5 trials    Rec:   Reg with thin liquids  Aspiration precautions  Oral care TID  Meds per pt preference  [address] EVALUATION     Patient: Martin Laguna (62 y.o. female)  Date: 1/11/2017  Primary Diagnosis: CVA (cerebral vascular accident) [facility])        Precautions: aspiration         ASSESSMENT :  Based on the objective data described below, the patient presents with oropharyngeal swallow fxn essentially [facility]. Strength, ROM, and coordination of the orofacial musculature were all found to be [facility]. All structures were intact and symmetrical. Pt with partials in place for evaluation. The pt presented with adequate oral transit times on all consistencies. Mastication time was appropriate. No s/sx of aspiration noted; hyolaryngeal elevation and excursion appeared adequate on all consistencies. No oral stasis noted post swallow. The pt denied globus sensation post swallow. Speech production was fluent. No dysarthria was observed. Expressive/receptive speech production appeared WFL to informal observation. Pt confused throughout evaluation; however, family states cognitive fxn is baseline. ST will continue to follow x 1-2 visits to ensure safety of diet tolerance. Patient will benefit from skilled intervention to address the above impairments. Patients rehabilitation potential is considered to be Good  Factors which may influence rehabilitation potential include:   [X]            None noted       PLAN :  Recommendations and Planned Interventions: See above  Frequency/Duration: Patient will be followed by speech-language pathology x 1-2 more visits to address goals. Discharge Recommendations: Home Health, Outpatient and To Be Determined       SUBJECTIVE:   Patient stated I live in [address] [place] [address].       OBJECTIVE:       Past Medical History   Diagnosis Date    Neurological disorder         dementia   History reviewed. No pertinent past surgical history. Prior Level of Function/Home Situation: private residence  [address] [place] [address] Environment: Private residence  [address] [place] [address] St: One story  Living Alone: No  Support Systems: Child(marie)  Patient Expects to be Discharged to[de-identified] Private residence  Current DME Used/Available at Home: Rheta Rkk, rolling  Diet prior to admission: reg with thin  Current Diet:  Reg with thin   Cognitive and Communication Status:  Neurologic State: Alert  Orientation Level: Oriented to person  Cognition: Follows commands           Oral Assessment:  Oral Assessment  Labial: No impairment  Dentition: Partials (comment) (upper)  Oral Hygiene: Adequate  Lingual: No impairment  Velum: No impairment  Mandible: No impairment  P.O. Trials:  Patient Position: [facility] at [facility]  Vocal quality prior to P.O.: No impairment  Consistency Presented: Thin liquid;Puree; Solid  How Presented: Self-fed/presented;Cup/sip;Spoon;Straw     Bolus Acceptance: No impairment  Bolus Formation/Control: No impairment     Propulsion: No impairment  Oral Residue: None  Initiation of Swallow: No impairment  Laryngeal Elevation: Functional  Aspiration Signs/Symptoms: None  Pharyngeal Phase Characteristics: No impairment, issues, or problems   Effective Modifications: None  Cues for Modifications: None        Oral Phase Severity: No impairment  Pharyngeal Phase Severity : No impairment     GCODESwallowing:  Swallow Current Status CI= 1-19%   Swallow Goal Status CH= 0%     The severity rating is based on the following outcomes:             Clinical Judgment     Pain:  Pt c/o 0/10 pain prior to evaluation. Pt c/o 0/10 pain post evaluation.      After treatment:   [ ]            Patient left in no apparent distress sitting up in chair  [X]            Patient left in no apparent distress in bed  [X]            Call bell left within reach  [X] Nursing notified  [ ]            Caregiver present  [ ]            Bed alarm activated      COMMUNICATION/EDUCATION:   [X]            SLP educated pt with regard to compensatory swallow strategies and                  aspiration/reflux precautions including: small bites/sips,                  alternate liquids/solids, decrease feeding rate, HOB > 45 with all po, and                             upright in bed at 30 degrees after po for at least 45 minutes. [X]            Patient/family have participated as able in goal setting and plan of care.      Thank you for this referral.     Emely Basilio M.S. CCC-SLP/L  Speech-Language Pathologist

## 2017-01-11 NOTE — PROGRESS NOTES
Replaced by [facility]  Progress Note    Patient: Amy Muñoz Age: 80 y.o. : 1924 MR#: [id number] SSN: [id number]  Date/Time: 2017 1:03 PM    Subjective:     dtr in room, remains confused, speech improved as well a r sided weakness    Objective:   VS:   Visit Vitals    /53 (BP 1 Location: Left arm, BP Patient Position: At rest)    Pulse 78    Temp 98.2 °F (36.8 °C)    Resp 19    Ht 5' 7\" (1.702 m)    Wt 70.3 kg (155 lb)    SpO2 95%    Breastfeeding No    BMI 24.28 kg/m2      Tmax/24hrs: Temp (24hrs), Av.9 °F (36.6 °C), Min:97.6 °F (36.4 °C), Max:98.2 °F (36.8 °C)     Input/Output:   Intake/Output Summary (Last 24 hours) at 17 1303  Last data filed at 17 0931   Gross per 24 hour   Intake              240 ml   Output                0 ml   Net              240 ml       General:  nad  Cardiovascular:  s1s2  Pulmonary:  clear  GI:  benign  Extremities:  No edema  Additional:  No skin changes  Neuro:  Oriented to self    Labs:    Recent Results (from the past 24 hour(s))   GLUCOSE, POC    Collection Time: 01/10/17  6:44 PM   Result Value Ref Range    Glucose (POC) 83 70 - 110 mg/dL   GLUCOSE, POC    Collection Time: 01/10/17  9:01 PM   Result Value Ref Range    Glucose (POC) 146 (H) 70 - 110 mg/dL   LIPID PANEL    Collection Time: 17  4:00 AM   Result Value Ref Range    LIPID PROFILE          Cholesterol, total 207 (H) <200 MG/DL    Triglyceride 42 <150 MG/DL    HDL Cholesterol 134 (H) 40 - 60 MG/DL    LDL, calculated 64.6 0 - 100 MG/DL    VLDL, calculated 8.4 MG/DL    CHOL/HDL Ratio 1.5 0 - 5.0     HEMOGLOBIN A1C WITH EAG    Collection Time: 17  4:00 AM   Result Value Ref Range    Hemoglobin A1c 5.5 4.2 - 5.6 %    Est. average glucose 675 mg/dL   METABOLIC PANEL, BASIC    Collection Time: 17  4:00 AM   Result Value Ref Range    Sodium 143 136 - 145 mmol/L    Potassium 3.8 3.5 - 5.5 mmol/L    Chloride 107 100 - 108 mmol/L    CO2 28 21 - 32 mmol/L    Anion gap 8 3.0 - 18 mmol/L    Glucose 89 74 - 99 mg/dL    BUN 13 7.0 - 18 MG/DL    Creatinine 0.94 0.6 - 1.3 MG/DL    BUN/Creatinine ratio 14 12 - 20      GFR est AA >60 >60 ml/min/1.73m2    GFR est non-AA 56 (L) >60 ml/min/1.73m2    Calcium 9.2 8.5 - 10.1 MG/DL   CBC WITH AUTOMATED DIFF    Collection Time: 01/11/17  4:00 AM   Result Value Ref Range    WBC 4.6 4.6 - 13.2 K/uL    RBC 3.41 (L) 4.20 - 5.30 M/uL    HGB 11.0 (L) 12.0 - 16.0 g/dL    HCT 32.6 (L) 35.0 - 45.0 %    MCV 95.6 74.0 - 97.0 FL    MCH 32.3 24.0 - 34.0 PG    MCHC 33.7 31.0 - 37.0 g/dL    RDW 13.5 11.6 - 14.5 %    PLATELET 398 766 - 938 K/uL    MPV 10.7 9.2 - 11.8 FL    NEUTROPHILS 46 40 - 73 %    LYMPHOCYTES 37 21 - 52 %    MONOCYTES 15 (H) 3 - 10 %    EOSINOPHILS 2 0 - 5 %    BASOPHILS 0 0 - 2 %    ABS. NEUTROPHILS 2.1 1.8 - 8.0 K/UL    ABS. LYMPHOCYTES 1.7 0.9 - 3.6 K/UL    ABS. MONOCYTES 0.7 0.05 - 1.2 K/UL    ABS. EOSINOPHILS 0.1 0.0 - 0.4 K/UL    ABS.  BASOPHILS 0.0 0.0 - 0.1 K/UL    DF AUTOMATED     GLUCOSE, POC    Collection Time: 01/11/17  7:34 AM   Result Value Ref Range    Glucose (POC) 89 70 - 110 mg/dL   GLUCOSE, POC    Collection Time: 01/11/17 11:20 AM   Result Value Ref Range    Glucose (POC) 93 70 - 110 mg/dL     Additional Data Reviewed:      Current Facility-Administered Medications   Medication Dose Route Frequency    cefTRIAXone (ROCEPHIN) 1 g in 0.9% sodium chloride (MBP/ADV) 50 mL MBP  1 g IntraVENous Q24H    aspirin chewable tablet 81 mg  81 mg Oral DAILY    calcium carbonate (OS-CONNIE) tablet 500 mg [elemental]  500 mg Oral TID WITH MEALS    cyanocobalamin tablet 1,000 mcg  1,000 mcg Oral DAILY    donepezil (ARICEPT) tablet 10 mg  10 mg Oral QHS    fish oil-omega-3 fatty acids 340-1,000 mg capsule 1 Cap  1 Cap Oral DAILY    memantine (NAMENDA) tablet 10 mg  10 mg Oral DAILY    timolol (TIMOPTIC) 0.5 % ophthalmic solution 1 Drop  1 Drop Right Eye DAILY    sodium chloride (NS) flush 5-10 mL  5-10 mL IntraVENous Q8H    sodium chloride (NS) flush 5-10 mL  5-10 mL IntraVENous PRN    heparin (porcine) injection 5,000 Units  5,000 Units SubCUTAneous Q8H       Assessment/Plan:     Patient Active Problem List   Diagnosis Code    CVA (cerebral vascular accident) ([facility] [address] I63.9   dementia  Ho tia's      Plan:    Cont asa, add statin  Permissive htn  Fu mri  Cont rocephin and fu cultures  Pt, ot  Appreciate neuro input     Case discussed with:  []Patient  []Family  []Nursing  []Case Management  DVT Prophylaxis:  []Lovenox  []Hep SQ  []SCDs  []Coumadin   []On Heparin gtt    Signed By: Eliana Marin MD

## 2017-01-12 ENCOUNTER — APPOINTMENT (OUTPATIENT)
Dept: MRI IMAGING | Age: 82
DRG: 069 | End: 2017-01-12
Attending: FAMILY MEDICINE
Payer: MEDICARE

## 2017-01-12 ENCOUNTER — APPOINTMENT (OUTPATIENT)
Dept: GENERAL RADIOLOGY | Age: 82
DRG: 069 | End: 2017-01-12
Attending: INTERNAL MEDICINE
Payer: MEDICARE

## 2017-01-12 LAB
BACTERIA SPEC CULT: NORMAL
SERVICE CMNT-IMP: NORMAL

## 2017-01-12 PROCEDURE — 74011250637 HC RX REV CODE- 250/637: Performed by: INTERNAL MEDICINE

## 2017-01-12 PROCEDURE — 74011000258 HC RX REV CODE- 258: Performed by: FAMILY MEDICINE

## 2017-01-12 PROCEDURE — 70551 MRI BRAIN STEM W/O DYE: CPT

## 2017-01-12 PROCEDURE — 97110 THERAPEUTIC EXERCISES: CPT

## 2017-01-12 PROCEDURE — 65660000000 HC RM CCU STEPDOWN

## 2017-01-12 PROCEDURE — 71010 XR CHEST SNGL V: CPT

## 2017-01-12 PROCEDURE — 74011250636 HC RX REV CODE- 250/636: Performed by: FAMILY MEDICINE

## 2017-01-12 PROCEDURE — 74011250637 HC RX REV CODE- 250/637: Performed by: FAMILY MEDICINE

## 2017-01-12 PROCEDURE — 74011000258 HC RX REV CODE- 258: Performed by: INTERNAL MEDICINE

## 2017-01-12 PROCEDURE — 97530 THERAPEUTIC ACTIVITIES: CPT

## 2017-01-12 RX ORDER — DEXTROSE MONOHYDRATE AND SODIUM CHLORIDE 5; .9 G/100ML; G/100ML
75 INJECTION, SOLUTION INTRAVENOUS CONTINUOUS
Status: DISPENSED | OUTPATIENT
Start: 2017-01-12 | End: 2017-01-13

## 2017-01-12 RX ORDER — LORAZEPAM 2 MG/ML
0.5 INJECTION INTRAMUSCULAR
Status: DISCONTINUED | OUTPATIENT
Start: 2017-01-12 | End: 2017-01-13

## 2017-01-12 RX ADMIN — Medication 10 ML: at 21:39

## 2017-01-12 RX ADMIN — Medication 10 ML: at 05:37

## 2017-01-12 RX ADMIN — HEPARIN SODIUM 5000 UNITS: 5000 INJECTION, SOLUTION INTRAVENOUS; SUBCUTANEOUS at 05:37

## 2017-01-12 RX ADMIN — TIMOLOL MALEATE 1 DROP: 5 SOLUTION OPHTHALMIC at 09:05

## 2017-01-12 RX ADMIN — DONEPEZIL HYDROCHLORIDE 10 MG: 5 TABLET, FILM COATED ORAL at 21:16

## 2017-01-12 RX ADMIN — ATORVASTATIN CALCIUM 40 MG: 40 TABLET, FILM COATED ORAL at 21:16

## 2017-01-12 RX ADMIN — HEPARIN SODIUM 5000 UNITS: 5000 INJECTION, SOLUTION INTRAVENOUS; SUBCUTANEOUS at 21:16

## 2017-01-12 RX ADMIN — CEFTRIAXONE 1 G: 1 INJECTION, POWDER, FOR SOLUTION INTRAMUSCULAR; INTRAVENOUS at 17:19

## 2017-01-12 RX ADMIN — Medication 10 ML: at 15:12

## 2017-01-12 RX ADMIN — HEPARIN SODIUM 5000 UNITS: 5000 INJECTION, SOLUTION INTRAVENOUS; SUBCUTANEOUS at 11:45

## 2017-01-12 RX ADMIN — DEXTROSE MONOHYDRATE AND SODIUM CHLORIDE 75 ML/HR: 5; .9 INJECTION, SOLUTION INTRAVENOUS at 21:16

## 2017-01-12 NOTE — ROUTINE PROCESS
2245: Pt in bed agitated and restless stating \" I am going to fall. Don't let me fall\" Unable to calm pt down. Her heart rate is ranging from 100-150's. Notified Physician and 1mg of ativan PRN Q6 hrs was ordered. Family is at bedside. 0000: Pt is in bed sleeping. Heart rate now in the 80's. Will continue to monitor.

## 2017-01-12 NOTE — PROGRESS NOTES
Bedside shift change report given to [address] [place] [address],[address] (oncoming nurse) by Carlos Russ RN (offgoing nurse). Report included the following information SBAR.

## 2017-01-12 NOTE — PROGRESS NOTES
conducted an initial consultation and Spiritual Assessment for Lianna Reyez, who is a 80 y.o.,female. Patients Primary Language is: [place]. According to the patients EMR Hindu Affiliation is: Pentecostalism.     The reason the Patient came to the hospital is:   Patient Active Problem List    Diagnosis Date Noted    CVA (cerebral vascular accident) ([facility] [address] 01/09/2017        The  provided the following Interventions:  Initiated a relationship of care and support. Listened empathically. Provided chaplaincy education. Provided information about Spiritual Care Services. Chart reviewed. The following outcomes where achieved:  Patient had a hard time keep herself awake. Daughter answered most of the questions and patient said yes with approval.    confirmed Patient's Hindu Affiliation, [address].   Patient processed feeling about current hospitalization, \"everyone is excellent. \"  Patient expressed gratitude for 's visit. Assessment:  There are no spiritual or Alevism issues which require intervention at this time. Plan:  Chaplains will continue to follow and will provide pastoral care on an as needed/requested basis.       [facility]   Spiritual Care  [phone]

## 2017-01-12 NOTE — PROGRESS NOTES
SUBJECTIVE:    Patient got ativan and has been sleeping since then per daughter. Has not eaten anything. Patient says no on tactile commands but does not open eyes. OBJECTIVE:    Visit Vitals    /67 (BP 1 Location: Left arm, BP Patient Position: At rest)    Pulse 60    Temp 95.7 °F (35.4 °C)    Resp 12    Ht 5' 7\" (1.702 m)    Wt 70.3 kg (155 lb)    SpO2 100%    Breastfeeding No    BMI 24.28 kg/m2     RRR  Diminished in bases, no wheezes  NT, BS +  No pedal edema  NAD    ASSESSMENT:    1. Right sided weakness with slurred speech on presentation  2. UTI s/p treatment  3. Acute metabolic encephalopathy  4. Advanced dementia with intermittent agitation  5.  HTN     PLAN:    Pending MRI report  Neurology to follow  Cont current management  IVF will be started  PT/OT recs noted - SNF on discharge    CMP: No results found for: NA, K, CL, CO2, AGAP, GLU, BUN, CREA, GFRAA, GFRNA, CA, MG, PHOS, ALB, TBIL, TP, ALB, GLOB, AGRAT, SGOT, ALT, GPT  CBC: No results found for: WBC, HGB, HGBEXT, HCT, HCTEXT, PLT, PLTEXT, HGBEXT, HCTEXT, PLTEXT

## 2017-01-12 NOTE — PROGRESS NOTES
Interdisciplinary Round Note   Patient Information: Patient Information: Martin Laguna                                      607/76   Reason for Admission: CVA (cerebral vascular accident) [facility])   Attending Provider:   China Cota MD  Primary Care Physician:       Negrito Weber MD       [phone]   Past Medical History:   Past Medical History   Diagnosis Date    Neurological disorder      dementia      Hospital day: 3  Estimated discharge date: unknown   RRAT Score: Medium Risk            18       Total Score        3 Relationship with PCP    4 More than 1 Admission in calendar year    5 Patient Insurance is Medicare, Medicaid or Self Pay    6 Charlson Comorbidity Score        Criteria that do not apply:    Patient Living Status    Patient Length of Stay > 5       Goals for Today: mri      Overnight Events: none     VITAL SIGNS  Vitals:    01/11/17 2308 01/12/17 0000 01/12/17 0400 01/12/17 0800   BP: 111/73 93/59 132/76 100/63   Pulse:  74 73 60   Resp:  19 19 19   Temp:  98 °F (36.7 °C) 98.5 °F (36.9 °C) 98.7 °F (37.1 °C)   SpO2: 97% 96% 97% 100%   Weight:       Height:              Lines, Drains, & Airways    Peripheral IV 01/09/17 Right Antecubital-Site Assessment: Clean, dry, & intact       VTE Prophylaxis                                   Intake and Output:   01/10 1901 - 01/12 0700  In: 480 [P.O.:480]  Out: -     Current Diet: DIET REGULAR          GI Prophylaxis: no        Type: na        Recent Glucose Results:   Lab Results   Component Value Date/Time    GLUCPOC 106 01/11/2017 04:37 PM    GLUCPOC 93 01/11/2017 11:20 AM          IV Antibiotics? Rocephin        When started: 1/11/2017   Activity Level: Activity Level: Head of bed elevated (degrees)  Needs assistance with ADLs: yes  PT Consult Status: ordered Current Immunizations: There is no immunization history on file for this patient.        Recommendations:   Discharge Disposition: TBD, pending progress    Needs for Discharge: tbd Recommendations from IDR team: continue present plan of care     Other Notes: none      '

## 2017-01-12 NOTE — ROUTINE PROCESS
Patient: Lawyer Rahman Age: 80 y.o. Sex: female     Bedside and Verbal shift change report given to RUSTY Muñoz  (oncoming nurse) by Perez Banda RN (offgoing nurse). Report included the following information SBAR, Kardex, MAR and Recent Results. PATIENT GOALS FOR TODAY PATIENT PRIORITIES FOR TODAY   Goals are: Safety, NIH, Monitor Vitals  Updated on Whiteboard in Patients Room: no   Patient states his/her priorities are: to get well  Updated on Whiteboard in Patients Room: no     SITUATION:   Code Status: DNR Reason for Admission: CVA (cerebral vascular accident) [facility]   Hospital day: 3 Problem List: Active Problems:    CVA (cerebral vascular accident) ([address] (1/9/2017)       Attending Provider:   Florence Peters MD Allergies: No Known Allergies   BACKGROUND:   Past Medical History:   Past Medical History   Diagnosis Date    Neurological disorder      dementia     ASSESSMENT:   Neuro:  Neurologic State: Confused, Orientation Level: Oriented to person, Disoriented to time, Disoriented to situation, Disoriented to place CV:  Patient on Telemetry: Cardiac/Telemetry Monitor On: Yes    Box Number: [id number]   Cardiac Rhythm: Normal sinus rhythm  Patient has a pace maker:   Endocrine   Recent Glucose Results:   Lab Results   Component Value Date/Time    GLUCPOC 106 01/11/2017 04:37 PM    GLUCPOC 93 01/11/2017 11:20 AM    GLUCPOC 89 01/11/2017 07:34 AM        Respiratory:  O2 Device: Room air       Supplemental O2         Incentive Spirometery          GI  Current diet: DIET REGULAR                                        Reasons if patient has a dewey: no dewey   Patient Safety  Restraints:    Family notified:    Other Alternatives:     Fall Risk   Total Score: 4   Safety Measures: Bed/Chair alarm on, Bed/Chair-Wheels locked, Bed in low position, Call light within reach, Family at bedside, Gripper socks, Side rails X 3 VTE Prophylaxis                WOUND (if present)  Wound Type:  none  Dressing present Dressing Present : No  Wound Concerns/Notes: no IV ACCESS     Reasons if patient has a central line: no central line     PAIN  Pain Assessment  Pain Intensity 1: 0 (01/12/17 0400)        Patient Stated Pain Goal: 0  Time of last intervention: 0400   Reassessment Completed: no Last Vitals:  Vitals:    01/11/17 2121 01/11/17 2308 01/12/17 0000 01/12/17 0400   BP:  111/73 93/59 132/76   Pulse:   74 73   Resp:   19 19   Temp:   98 °F (36.7 °C) 98.5 °F (36.9 °C)   SpO2: 97% 97% 96% 97%   Weight:       Height:          Last 3 Weights:  Last 3 Recorded Weights in this Encounter    01/09/17 2144 01/10/17 1844   Weight: 68 kg (150 lb) 70.3 kg (155 lb)     Weight change:   LAB RESULTS  Recent Labs      01/11/17 0400 01/09/17 2147   WBC  4.6  3.7*   HGB  11.0*  11.9*   HCT  32.6*  36.4   PLT  185  197     Recent Labs      01/11/17 0400 01/09/17 2147   NA  143  143   K  3.8  4.0   GLU  89  118*   BUN  13  21*   CREA  0.94  1.21   CA  9.2  9.3   INR   --   1.0      RECOMMENDATIONS AND DISCHARGE PLANNING   1. Discharge plan for patient // Needs or barriers to disharge: none . 2. Estimated Discharge Date: TBD Posted on Whiteboard in Patients Room: no    \"HEALS\" SAFETY CHECK   A safety check occurred in the patient's room between off going nurse and oncoming nurse listed above. The safety check included the below items  Area Items   H  High Alert Meds  - Verify all high alert medication drips (heparin, PCA, etc.)   E  Equipment - Suction is set up for ALL patients (with yanker)  - Red plugs utilized for all equipment (IV pumps, etc.)  - WOWs wiped down at end of shift.  - Room stocked with oxygen, suction, and other unit-specific supplies   A  Alarms - Bed alarm is set for fall risk patients  - Ensure chair alarm is in place and activated if patient is up in a chair   L  Lines - Check IV for any infiltration  - Campbell bag is empty if patient has a Campbell   - Tubing and IV bags are labeled   S  Safety   - Room is clean, patient is clean, and equipment is clean. - Ensure room is clear and free of clutter  - Hallways are clear from equipment besides carts.    - Fall bracelet on for fall risk patients  - Suction is set up for ALL patients (with rashaad)  - Isolation precautions followed, supplies available outside room, sign posted   Sara Carlton RN

## 2017-01-12 NOTE — PROGRESS NOTES
Bedside and Verbal shift change report given to General Bjorn RN  (oncoming nurse) by Jasmyn Benton RN (offgoing nurse). Report included the following information SBAR, Kardex, MAR and Recent Results.     SITUATION:    Code Status: DNR   Reason for Admission: CVA (cerebral vascular accident) ([facility] [address]    [facility] day: 3   Problem List:       Hospital Problems  Never Reviewed          Codes Class Noted POA    CVA (cerebral vascular accident) [facility]) ICD-10-CM: I63.9  ICD-9-CM: 434.91  1/9/2017 Unknown              BACKGROUND:    Past Medical History:   Past Medical History   Diagnosis Date    Neurological disorder      dementia         Patient taking anticoagulants yes     ASSESSMENT:    Changes in Assessment Throughout Shift: patient completed MRI      Patient has Central Line: no Reasons if yes: na   Patient has Campbell Cath: no Reasons if yes: na      Last Vitals:     Vitals:    01/12/17 0400 01/12/17 0800 01/12/17 1200 01/12/17 1600   BP: 132/76 100/63 94/56 101/67   Pulse: 73 60 60 60   Resp: 19 19 19 12   Temp: 98.5 °F (36.9 °C) 98.7 °F (37.1 °C) 97.1 °F (36.2 °C) 95.7 °F (35.4 °C)   SpO2: 97% 100% 99% 100%   Weight:       Height:            IV and DRAINS (will only show if present)   Peripheral IV 01/09/17 Right Antecubital-Site Assessment: Clean, dry, & intact     WOUND (if present)   Wound Type:  none   Dressing present Dressing Present : No   Wound Concerns/Notes:  none     PAIN    Pain Assessment    Pain Intensity 1: 0 (01/12/17 1612)              Patient Stated Pain Goal: 0  o Interventions for Pain:  Patient complains of no pain   o Intervention effective: nan  o Time of last intervention: na   o Reassessment Completed: na      Last 3 Weights:  Last 3 Recorded Weights in this Encounter    01/09/17 2144 01/10/17 1844   Weight: 68 kg (150 lb) 70.3 kg (155 lb)     Weight change:      INTAKE/OUPUT    Current Shift: 01/12 0701 - 01/12 1900  In: 240 [P.O.:240]  Out: -     Last three shifts: 01/10 1901 - 01/12 0700  In: 480 [P.O.:480]  Out: -      LAB RESULTS     Recent Labs      01/11/17   0400  01/09/17   2147   WBC  4.6  3.7*   HGB  11.0*  11.9*   HCT  32.6*  36.4   PLT  185  197        Recent Labs      01/11/17   0400  01/09/17   2147   NA  143  143   K  3.8  4.0   GLU  89  118*   BUN  13  21*   CREA  0.94  1.21   CA  9.2  9.3   INR   --   1.0       RECOMMENDATIONS AND DISCHARGE PLANNING     1. Pending tests/procedures/ Plan of Care or Other Needs: Q4NIH     2. Discharge plan for patient and Needs/Barriers: snf    3. Estimated Discharge Date: January 13, 2017 Posted on Whiteboard in \[facility] [place] [facility]\"": yes      4. The patient's care plan was reviewed with the oncoming nurse. \"HEALS\" SAFETY CHECK      Fall Risk    Total Score: 4    Safety Measures: Safety Measures: Bed/Chair alarm on, Bed/Chair-Wheels locked, Bed in low position, Call light within reach, Family at bedside, Gripper socks, Side rails X 3    A safety check occurred in the patient's room between off going nurse and oncoming nurse listed above. The safety check included the below items  Area Items   H  High Alert Medications - Verify all high alert medication drips (heparin, PCA, etc.)   E  Equipment - Suction is set up for ALL patients (with yanker)  - Red plugs utilized for all equipment (IV pumps, etc.)  - WOWs wiped down at end of shift.  - Room stocked with oxygen, suction, and other unit-specific supplies   A  Alarms - Bed alarm is set for fall risk patients  - Ensure chair alarm is in place and activated if patient is up in a chair   L  Lines - Check IV for any infiltration  - Campbell bag is empty if patient has a Campbell   - Tubing and IV bags are labeled   S  Safety   - Room is clean, patient is clean, and equipment is clean. - Hallways are clear from equipment besides carts.    - Fall bracelet on for fall risk patients  - Ensure room is clear and free of clutter  - Suction is set up for ALL patients (with yanker)  - Hallways are clear from equipment besides carts.    - Isolation precautions followed, supplies available outside room, sign posted     Lashawn Cohn, RN

## 2017-01-13 VITALS
HEIGHT: 67 IN | OXYGEN SATURATION: 98 % | TEMPERATURE: 98.2 F | WEIGHT: 147.9 LBS | HEART RATE: 57 BPM | SYSTOLIC BLOOD PRESSURE: 101 MMHG | DIASTOLIC BLOOD PRESSURE: 62 MMHG | BODY MASS INDEX: 23.21 KG/M2 | RESPIRATION RATE: 18 BRPM

## 2017-01-13 LAB
ANION GAP BLD CALC-SCNC: 8 MMOL/L (ref 3–18)
BUN SERPL-MCNC: 21 MG/DL (ref 7–18)
BUN/CREAT SERPL: 20 (ref 12–20)
CALCIUM SERPL-MCNC: 8.6 MG/DL (ref 8.5–10.1)
CHLORIDE SERPL-SCNC: 106 MMOL/L (ref 100–108)
CO2 SERPL-SCNC: 29 MMOL/L (ref 21–32)
CREAT SERPL-MCNC: 1.06 MG/DL (ref 0.6–1.3)
GLUCOSE SERPL-MCNC: 142 MG/DL (ref 74–99)
MAGNESIUM SERPL-MCNC: 2.1 MG/DL (ref 1.8–2.4)
POTASSIUM SERPL-SCNC: 4.1 MMOL/L (ref 3.5–5.5)
SODIUM SERPL-SCNC: 143 MMOL/L (ref 136–145)

## 2017-01-13 PROCEDURE — 92526 ORAL FUNCTION THERAPY: CPT

## 2017-01-13 PROCEDURE — 80048 BASIC METABOLIC PNL TOTAL CA: CPT | Performed by: INTERNAL MEDICINE

## 2017-01-13 PROCEDURE — 74011250636 HC RX REV CODE- 250/636: Performed by: FAMILY MEDICINE

## 2017-01-13 PROCEDURE — 36415 COLL VENOUS BLD VENIPUNCTURE: CPT | Performed by: INTERNAL MEDICINE

## 2017-01-13 PROCEDURE — 74011250637 HC RX REV CODE- 250/637: Performed by: FAMILY MEDICINE

## 2017-01-13 PROCEDURE — 83735 ASSAY OF MAGNESIUM: CPT | Performed by: INTERNAL MEDICINE

## 2017-01-13 RX ORDER — LORAZEPAM 0.5 MG/1
0.5 TABLET ORAL
Status: DISCONTINUED | OUTPATIENT
Start: 2017-01-13 | End: 2017-01-13 | Stop reason: HOSPADM

## 2017-01-13 RX ORDER — ATORVASTATIN CALCIUM 20 MG/1
20 TABLET, FILM COATED ORAL
Status: DISCONTINUED | OUTPATIENT
Start: 2017-01-13 | End: 2017-01-13 | Stop reason: HOSPADM

## 2017-01-13 RX ORDER — LORAZEPAM 0.5 MG/1
0.5 TABLET ORAL
Qty: 10 TAB | Refills: 0 | Status: SHIPPED | OUTPATIENT
Start: 2017-01-13

## 2017-01-13 RX ORDER — CEFUROXIME AXETIL 250 MG/1
250 TABLET ORAL EVERY 12 HOURS
Status: DISCONTINUED | OUTPATIENT
Start: 2017-01-13 | End: 2017-01-13 | Stop reason: HOSPADM

## 2017-01-13 RX ORDER — ATORVASTATIN CALCIUM 20 MG/1
20 TABLET, FILM COATED ORAL
Qty: 30 TAB | Refills: 0 | Status: SHIPPED
Start: 2017-01-13 | End: 2017-04-19 | Stop reason: CLARIF

## 2017-01-13 RX ORDER — CEFUROXIME AXETIL 250 MG/1
250 TABLET ORAL EVERY 12 HOURS
Qty: 6 TAB | Refills: 0 | Status: SHIPPED
Start: 2017-01-13 | End: 2017-01-16

## 2017-01-13 RX ADMIN — HEPARIN SODIUM 5000 UNITS: 5000 INJECTION, SOLUTION INTRAVENOUS; SUBCUTANEOUS at 05:47

## 2017-01-13 RX ADMIN — CYANOCOBALAMIN TAB 1000 MCG 1000 MCG: 1000 TAB at 08:24

## 2017-01-13 RX ADMIN — ASPIRIN 81 MG CHEWABLE TABLET 81 MG: 81 TABLET CHEWABLE at 08:24

## 2017-01-13 RX ADMIN — Medication 1 CAPSULE: at 08:24

## 2017-01-13 RX ADMIN — Medication 10 ML: at 05:47

## 2017-01-13 RX ADMIN — Medication 500 MG: at 08:24

## 2017-01-13 RX ADMIN — MEMANTINE 10 MG: 10 TABLET ORAL at 08:24

## 2017-01-13 RX ADMIN — TIMOLOL MALEATE 1 DROP: 5 SOLUTION OPHTHALMIC at 08:25

## 2017-01-13 NOTE — DISCHARGE SUMMARY
[address] SUMMARY    Name:  Pedro Polk  MR#:  [id number]  :  [id number]  Account #:  [de-identified]  Date of Adm:  2017  Date of Transfer:      DISPOSITION: Discharged to skilled care nursing facility. DISCHARGE CONDITION: Stable. DISCHARGE DIAGNOSES  1. Right-sided weakness and slurred speech on presentation, resolved,  most likely transient ischemic attack. 2. Urinary tract infection. 3. Acute metabolic on chronic encephalopathy, back to baseline. 4. Advanced dementia and intermittent agitation. 5. Hypertension. 6. Left atelectasis. 7. Dyslipidemia. DISCHARGE MEDICATIONS  1. Aspirin 81 mg daily. 2. Lipitor 20 mg daily. 3. Ceftin 250 mg b.i.d. for 3 days. 4. Ativan 0.5 mg at bedtime as needed for agitation or restlessness. 5. Namenda 10 mg daily. 6. Aricept 10 mg daily. 7. Vitamin B12 1000 mcg daily. 8. Fish oil 1 capsule daily. 9. Timolol 1 drop in right eye daily. 10. Lutein vision 1 tablet daily. 11. Mylanta 400 mg 3 times daily as needed for heartburn. IMAGING AND PROCEDURES  1. CT scan of the head was done, reported negative for any acute  hemorrhage. 2. MRI of the brain was done, negative for any acute intracranial  finding other than diffuse marked cerebral cortical atrophy. 3. Chest x-ray was negative for any acute finding other than left  atelectasis and possible COPD.  4. Urine culture was positive for E coli sensitive to Rocephin. CONSULTANTS: Neurology with Dr. Connie Winters: The patient was admitted to the hospital with a  complaint of slurred speech and right-sided weakness. Please refer to  hospital admission H and P done by Dr. Raymon Romero for further details. The patient was admitted here with a diagnosis of TIA. She was  continued on aspirin. She was seen by neurologist and they just  recommended MRI of the brain. MRI of the brain was negative. Her  symptoms already got better.  She was also found out to have urinary  tract infection which was treated with Rocephin. She had a chest x-ray  done which showed left atelectasis and will be continued on Ceftin for  3 more days and will be provided incentive spirometer. She was  saturating 94% on room air. The patient was awake and oriented on  the day of discharge. She required some Ativan as needed during  hospital course for agitation due to underlying dementia. She was back  to her baseline. A neurologist did not recommend any further  intervention. She will be continued on above-mentioned medication  and discharged to skilled care facility for further care. DISCHARGE INSTRUCTIONS  1. Diet: Cardiac diet, aspiration precautions. Speech therapist to follow. 2. Fall precautions, dementia precautions. PT, OT to follow. 3. Nursing home doctor to follow this patient. 4. Follow with Dr. Anam Dahl in 4-6 weeks for dementia and TIA  management as an outpatient. Total time greater than 35 minutes. Patient is a DNR.         MD BART Zhang / [employer]  D:  01/13/2017   11:51  T:  01/13/2017   12:19  Job #:  [id number]    Sarmad Ulloa MD

## 2017-01-13 NOTE — PROGRESS NOTES
SUBJECTIVE:    Patient is awake and oriented to person [knows her daughter], time [knows ] and place [[facility]]. She ate this morning without any problem. Denies for chest and abdominal pain. No N/V/D. OBJECTIVE:    Visit Vitals    /76 (BP 1 Location: Left arm, BP Patient Position: At rest)    Pulse 63    Temp 97.1 °F (36.2 °C)    Resp 18    Ht 5' 7\" (1.702 m)    Wt 67.1 kg (147 lb 14.4 oz)    SpO2 95%    Breastfeeding No    BMI 23.16 kg/m2     RRR  CTA bilaterally  NT, BS +  No pedal edema  NAD    ASSESSMENT:    1. Right sided weakness with slurred speech on presentation, resolved   2. UTI s/p treatment  3. Acute metabolic encephalopathy, back to baseline per family  4. Advanced dementia with intermittent agitation  5. HTN  6. Left atelectasis      PLAN:    MRI report reviewed  Cont current management   SNF today  Talked to daughter at bedside    CMP:   Lab Results   Component Value Date/Time     2017 01:57 AM    K 4.1 2017 01:57 AM     2017 01:57 AM    CO2 29 2017 01:57 AM    AGAP 8 2017 01:57 AM     (H) 2017 01:57 AM    BUN 21 (H) 2017 01:57 AM    CREA 1.06 2017 01:57 AM    GFRAA 59 (L) 2017 01:57 AM    GFRNA 48 (L) 2017 01:57 AM    CA 8.6 2017 01:57 AM    MG 2.1 2017 01:57 AM     CBC: No results found for: WBC, HGB, HGBEXT, HCT, HCTEXT, PLT, PLTEXT, HGBEXT, HCTEXT, PLTEXT      Current Discharge Medication List      START taking these medications    Details   atorvastatin (LIPITOR) 20 mg tablet Take 1 Tab by mouth nightly. Qty: 30 Tab, Refills: 0      cefUROXime (CEFTIN) 250 mg tablet Take 1 Tab by mouth every twelve (12) hours for 3 days. Qty: 6 Tab, Refills: 0      LORazepam (ATIVAN) 0.5 mg tablet Take 1 Tab by mouth nightly as needed.  Max Daily Amount: 0.5 mg.  Qty: 10 Tab, Refills: 0         CONTINUE these medications which have NOT CHANGED    Details   VIT C/VIT E ACETATE/LUTEIN/MIN (LUTEIN VISON FORMULA PO) Take  by mouth.      memantine (NAMENDA) 10 mg tablet Take 10 mg by mouth daily. calcium carbonate (MYLANTA) 400 mg chew Take 1 Tab by mouth three (3) times daily (with meals). omega-3 fatty acids-vitamin e (FISH OIL) 1,000 mg cap Take 1 Cap by mouth. fish oil-omega-3 fatty acids 340-1,000 mg capsule Take 1 Cap by mouth daily. cyanocobalamin (VITAMIN B-12) 1,000 mcg tablet Take 1,000 mcg by mouth daily. timolol maleate 0.5 % drpd ophthalmic solution Administer 1 Drop to right eye daily. aspirin 81 mg chewable tablet Take 1 Tab by mouth daily. Qty: 30 Tab, Refills: 1      donepezil (ARICEPT) 10 mg tablet Take 10 mg by mouth nightly.  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

## 2017-01-13 NOTE — PROGRESS NOTES
Problem: Dysphagia (Adult)  Goal: *Acute Goals and Plan of Care (Insert Text)  Rec:   Reg with thin liquids  Aspiration precautions  Oral care TID  Meds per pt preference   Outcome: Resolved/Met Date Met:  01/13/17  SPEECH LANGUAGE PATHOLOGY DYSPHAGIA TREATMENT/DISCHARGE     Patient: Emilee Ryan (21 y.o. female)  Date: 1/13/2017  Diagnosis: CVA (cerebral vascular accident) ([address] <principal problem not specified>       Precautions: aspiration        ASSESSMENT:  Pt was seen at bedside for f/u dysphagia management. Pt tolerating reg solid and thin liquid consistencies without any overt s/sx of aspiration. Laryngeal elevation appeared WFL to palpation across all trials with positive oral clearance. Per family: pt has been tolerating meal trays without difficulty. They also report speech/language at baseline. Continue to rec reg diet with thin liquids, aspiration precautions, oral care TID, and meds as tolerated. No further skilled SLP services are indicated at this time. Please re-consult if needed. PLAN:  Recommendations and Planned Interventions: See above  Discharge Recommendations:  [address] and To Be Determined       SUBJECTIVE:   Patient stated I like sweet things. OBJECTIVE:   Cognitive and Communication Status:  Neurologic State: Alert, Confused, Drowsy  Orientation Level: Oriented to person, Disoriented to place, Disoriented to situation, Disoriented to time  Cognition: Impaired decision making, Decreased command following, Decreased attention/concentration           Dysphagia Treatment:  Oral Assessment:  Oral Assessment  Labial: No impairment  Dentition: Partials (comment) (upper)  Oral Hygiene: Adequate  Lingual: No impairment  Velum: No impairment  Mandible: No impairment  P.O. Trials:              Patient Position: 50 at [facility]              Vocal quality prior to P.O.: No impairment              Consistency Presented:  Thin liquid, Puree, Solid How Presented: Self-fed/presented, Cup/sip, Spoon, Straw                             Bolus Acceptance: No impairment              Bolus Formation/Control: No impairment                             Propulsion: No impairment              Oral Residue: None              Initiation of Swallow: No impairment              Laryngeal Elevation: Functional              Aspiration Signs/Symptoms: None              Pharyngeal Phase Characteristics: No impairment, issues, or problems               Effective Modifications: None              Cues for Modifications: None                                Oral Phase Severity: No impairment              Pharyngeal Phase Severity : No impairment                PAIN:  Pt reports 0/10 pain or discomfort prior to tx. Pt reports 0/10 pain or discomfort post tx. After treatment:   [ ]              Patient left in no apparent distress sitting up in chair  [X]              Patient left in no apparent distress in bed  [X]              Call bell left within reach  [ ]              Nursing notified  [X]              Caregiver present  [ ]              Bed alarm activated         COMMUNICATION/EDUCATION:   [X]              SLP educated pt with regard to compensatory swallow strategies and                   aspiration/reflux precautions including: small bites/sips,                   alternate liquids/solids, decrease feeding rate, HOB > 45 with all po, and                   upright in bed at 30 degrees after po for at least 45                   minutes.       Thank you for this referral.     Beckie Ndiaye M.S. CCC-SLP/L  Speech-Language Pathologist

## 2017-01-13 NOTE — PROGRESS NOTES
Care Management Interventions  Mode of Transport at Discharge: [address]  [address] Time of Discharge: 1500  Transition of Care Consult (CM Consult): SNF  Physical Therapy Consult: Yes  Occupational Therapy Consult: Yes  Speech Therapy Consult: Yes  Current Support Network: Family Lives Nearby, Other  Plan discussed with Pt/Family/Caregiver: Yes  Freedom of Choice Offered: Yes (For SNF)  Discharge Location  Discharge Placement: Skilled nursing facility     Pt is a 80 year female admitted for CVA. Pt is asleep with her daughter Jarret Mcrae at her bedside. Pt's daughter shares that her sister Cedrick Rose ([phone]) is the decision maker for pt.  contacted Cedrick Rose who shares that she wants pt to benefit from rehab prior to returning home. Pt has been independent with ADLs and pt's daughter wishes for her to be transferred to a SNF and have chosen [facility]. Facility has accepted and pt is scheduled for a 1500  with [employer]. Pt's daughter, nurse, physician and facility are aware.

## 2017-01-13 NOTE — ROUTINE PROCESS
Patient: Yaw Nicholas Age: 80 y.o. Sex: female     Bedside and Verbal shift change report given to Devorah Leo RN (oncoming nurse) by Rebeca William RN (offgoing nurse). Report included the following information SBAR, Kardex, MAR and Recent Results. PATIENT GOALS FOR TODAY PATIENT PRIORITIES FOR TODAY   Goals are: NIH, safety  Updated on Whiteboard in Patients Room: no   Patient states his/her priorities are: to get well  Updated on Whiteboard in Patients Room: no     SITUATION:   Code Status: DNR Reason for Admission: CVA (cerebral vascular accident) [facility])   Hospital day: 4 Problem List: Active Problems:    CVA (cerebral vascular accident) ([facility] [address] (1/9/2017)       Attending Provider:   Boston Tee MD Allergies: No Known Allergies   BACKGROUND:   Past Medical History:   Past Medical History   Diagnosis Date    Neurological disorder      dementia     ASSESSMENT:   Neuro:  Neurologic State: Alert, Confused, Drowsy, Orientation Level: Oriented to person, Disoriented to place, Disoriented to situation, Disoriented to time CV:  Patient on Telemetry: Cardiac/Telemetry Monitor On: Yes    Box Number: 82   Cardiac Rhythm: Normal sinus rhythm, Sinus bradycardia  Patient has a pace maker:   Endocrine   Recent Glucose Results:   Lab Results   Component Value Date/Time     (H) 01/13/2017 01:57 AM        Respiratory:  O2 Device: Room air       Supplemental O2         Incentive Spirometery          GI  Current diet: DIET REGULAR                                        Reasons if patient has a dewey: no dewey   Patient Safety  Restraints:    Family notified:    Other Alternatives:     Fall Risk   Total Score: 4   Safety Measures: Bed/Chair alarm on, Bed/Chair-Wheels locked, Bed in low position, Call light within reach, Family at bedside, Gripper socks VTE Prophylaxis                WOUND (if present)  Wound Type:  none  Dressing present Dressing Present : No  Wound Concerns/Notes: no IV ACCESS Reasons if patient has a central line: no central line     PAIN  Pain Assessment  Pain Intensity 1: 0 (01/13/17 0800)        Patient Stated Pain Goal: 0  Time of last intervention: 0400   Reassessment Completed: no Last Vitals:  Vitals:    01/12/17 1957 01/12/17 2352 01/13/17 0354 01/13/17 0800   BP: 143/71 111/55 125/64 144/76   Pulse: 63 (!) 56 (!) 54 63   Resp: 15 15 15 18   Temp: 96.8 °F (36 °C) 96.9 °F (36.1 °C) 96.7 °F (35.9 °C) 97.1 °F (36.2 °C)   SpO2: 100% 100% 100% 95%   Weight:   67.1 kg (147 lb 14.4 oz)    Height:          Last 3 Weights:  Last 3 Recorded Weights in this Encounter    01/09/17 2144 01/10/17 1844 01/13/17 0354   Weight: 68 kg (150 lb) 70.3 kg (155 lb) 67.1 kg (147 lb 14.4 oz)     Weight change:   LAB RESULTS  Recent Labs      01/11/17   0400   WBC  4.6   HGB  11.0*   HCT  32.6*   PLT  185     Recent Labs      01/13/17   0157  01/11/17   0400   NA  143  143   K  4.1  3.8   GLU  142*  89   BUN  21*  13   CREA  1.06  0.94   CA  8.6  9.2   MG  2.1   --       RECOMMENDATIONS AND DISCHARGE PLANNING   1. Discharge plan for patient // Needs or barriers to disharge: none . 2. Estimated Discharge Date: TBD Posted on Whiteboard in Patients Room: no    \"HEALS\" SAFETY CHECK   A safety check occurred in the patient's room between off going nurse and oncoming nurse listed above. The safety check included the below items  Area Items   H  High Alert Meds  - Verify all high alert medication drips (heparin, PCA, etc.)   E  Equipment - Suction is set up for ALL patients (with yanker)  - Red plugs utilized for all equipment (IV pumps, etc.)  - WOWs wiped down at end of shift.  - Room stocked with oxygen, suction, and other unit-specific supplies   A  Alarms - Bed alarm is set for fall risk patients  - Ensure chair alarm is in place and activated if patient is up in a chair   L  Lines - Check IV for any infiltration  - Campbell bag is empty if patient has a Campbell   - Tubing and IV bags are labeled S  Safety   - Room is clean, patient is clean, and equipment is clean. - Ensure room is clear and free of clutter  - Hallways are clear from equipment besides carts.    - Fall bracelet on for fall risk patients  - Suction is set up for ALL patients (with rashaad)  - Isolation precautions followed, supplies available outside room, sign posted   Christine Caldwell RN

## 2017-01-13 NOTE — ROUTINE PROCESS
Patient in bed no sign of distress, vital wnl. Patient is to be transferred to [facility]. Report Giving to nurse at [facility].

## 2017-01-13 NOTE — DISCHARGE SUMMARY
PATIENT DISCHARGE INSTRUCTIONS      PATIENT DISCHARGE INSTRUCTIONS    Elvia Chatterjee / [id number] : 1924    Admitted 2017 Discharged: 2017     Dictated # [id number]    I could not find DNR signed by POA. As per patient's daughter Brooksie Mcburney is at bedside] - her sister is at work until 5.30 pm.  She can sign it now and then her sister can co-sign the DNR form once patient is at nursing home    · It is important that you take the medication exactly as they are prescribed. · Keep your medication in the bottles provided by the pharmacist and keep a list of the medication names, dosages, and times to be taken in your wallet. · Do not take other medications without consulting your doctor. What to do at Home    Recommended Diet: Cardiac Diet    Recommended Activity: PT/OT Eval and Treat    Current Discharge Medication List      START taking these medications    Details   atorvastatin (LIPITOR) 20 mg tablet Take 1 Tab by mouth nightly. Qty: 30 Tab, Refills: 0      cefUROXime (CEFTIN) 250 mg tablet Take 1 Tab by mouth every twelve (12) hours for 3 days. Qty: 6 Tab, Refills: 0      LORazepam (ATIVAN) 0.5 mg tablet Take 1 Tab by mouth nightly as needed. Max Daily Amount: 0.5 mg.  Qty: 10 Tab, Refills: 0         CONTINUE these medications which have NOT CHANGED    Details   VIT C/VIT E ACETATE/LUTEIN/MIN (LUTEIN VISON FORMULA PO) Take  by mouth.      memantine (NAMENDA) 10 mg tablet Take 10 mg by mouth daily. calcium carbonate (MYLANTA) 400 mg chew Take 1 Tab by mouth three (3) times daily (with meals). omega-3 fatty acids-vitamin e (FISH OIL) 1,000 mg cap Take 1 Cap by mouth. fish oil-omega-3 fatty acids 340-1,000 mg capsule Take 1 Cap by mouth daily. cyanocobalamin (VITAMIN B-12) 1,000 mcg tablet Take 1,000 mcg by mouth daily. timolol maleate 0.5 % drpd ophthalmic solution Administer 1 Drop to right eye daily. aspirin 81 mg chewable tablet Take 1 Tab by mouth daily.   Qty: 30 Tab, Refills: 1      donepezil (ARICEPT) 10 mg tablet Take 10 mg by mouth nightly.  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

## 2017-02-06 ENCOUNTER — HOME HEALTH ADMISSION (OUTPATIENT)
Dept: HOME HEALTH SERVICES | Facility: HOME HEALTH | Age: 82
End: 2017-02-06
Payer: MEDICARE

## 2017-02-15 ENCOUNTER — HOME CARE VISIT (OUTPATIENT)
Dept: HOME HEALTH SERVICES | Facility: HOME HEALTH | Age: 82
End: 2017-02-15

## 2017-02-16 ENCOUNTER — HOME CARE VISIT (OUTPATIENT)
Dept: SCHEDULING | Facility: HOME HEALTH | Age: 82
End: 2017-02-16
Payer: MEDICARE

## 2017-02-16 PROCEDURE — 3331090002 HH PPS REVENUE DEBIT

## 2017-02-16 PROCEDURE — 3331090001 HH PPS REVENUE CREDIT

## 2017-02-16 PROCEDURE — G0151 HHCP-SERV OF PT,EA 15 MIN: HCPCS

## 2017-02-16 PROCEDURE — 400013 HH SOC

## 2017-02-17 VITALS — HEART RATE: 64 BPM | DIASTOLIC BLOOD PRESSURE: 70 MMHG | SYSTOLIC BLOOD PRESSURE: 126 MMHG | OXYGEN SATURATION: 93 %

## 2017-02-17 PROCEDURE — 3331090002 HH PPS REVENUE DEBIT

## 2017-02-17 PROCEDURE — 3331090001 HH PPS REVENUE CREDIT

## 2017-02-18 PROCEDURE — 3331090002 HH PPS REVENUE DEBIT

## 2017-02-18 PROCEDURE — 3331090001 HH PPS REVENUE CREDIT

## 2017-02-19 PROCEDURE — 3331090002 HH PPS REVENUE DEBIT

## 2017-02-19 PROCEDURE — 3331090001 HH PPS REVENUE CREDIT

## 2017-02-20 PROCEDURE — 3331090001 HH PPS REVENUE CREDIT

## 2017-02-20 PROCEDURE — 3331090002 HH PPS REVENUE DEBIT

## 2017-02-21 ENCOUNTER — HOME CARE VISIT (OUTPATIENT)
Dept: HOME HEALTH SERVICES | Facility: HOME HEALTH | Age: 82
End: 2017-02-21
Payer: MEDICARE

## 2017-02-21 PROCEDURE — 3331090001 HH PPS REVENUE CREDIT

## 2017-02-21 PROCEDURE — 3331090002 HH PPS REVENUE DEBIT

## 2017-02-22 ENCOUNTER — HOME CARE VISIT (OUTPATIENT)
Dept: SCHEDULING | Facility: HOME HEALTH | Age: 82
End: 2017-02-22
Payer: MEDICARE

## 2017-02-22 VITALS — DIASTOLIC BLOOD PRESSURE: 72 MMHG | SYSTOLIC BLOOD PRESSURE: 120 MMHG | HEART RATE: 58 BPM | OXYGEN SATURATION: 96 %

## 2017-02-22 VITALS — SYSTOLIC BLOOD PRESSURE: 120 MMHG | DIASTOLIC BLOOD PRESSURE: 70 MMHG | OXYGEN SATURATION: 92 % | HEART RATE: 58 BPM

## 2017-02-22 PROCEDURE — G0157 HHC PT ASSISTANT EA 15: HCPCS

## 2017-02-22 PROCEDURE — 3331090002 HH PPS REVENUE DEBIT

## 2017-02-22 PROCEDURE — 3331090001 HH PPS REVENUE CREDIT

## 2017-02-22 PROCEDURE — G0152 HHCP-SERV OF OT,EA 15 MIN: HCPCS

## 2017-02-23 ENCOUNTER — HOME CARE VISIT (OUTPATIENT)
Dept: HOME HEALTH SERVICES | Facility: HOME HEALTH | Age: 82
End: 2017-02-23
Payer: MEDICARE

## 2017-02-23 VITALS — DIASTOLIC BLOOD PRESSURE: 62 MMHG | SYSTOLIC BLOOD PRESSURE: 115 MMHG | HEART RATE: 71 BPM | OXYGEN SATURATION: 97 %

## 2017-02-23 PROCEDURE — G0151 HHCP-SERV OF PT,EA 15 MIN: HCPCS

## 2017-02-23 PROCEDURE — 3331090001 HH PPS REVENUE CREDIT

## 2017-02-23 PROCEDURE — 3331090002 HH PPS REVENUE DEBIT

## 2017-02-24 ENCOUNTER — HOME CARE VISIT (OUTPATIENT)
Dept: HOME HEALTH SERVICES | Facility: HOME HEALTH | Age: 82
End: 2017-02-24
Payer: MEDICARE

## 2017-02-24 PROCEDURE — 3331090002 HH PPS REVENUE DEBIT

## 2017-02-24 PROCEDURE — 3331090001 HH PPS REVENUE CREDIT

## 2017-02-25 PROCEDURE — 3331090001 HH PPS REVENUE CREDIT

## 2017-02-25 PROCEDURE — 3331090002 HH PPS REVENUE DEBIT

## 2017-02-26 PROCEDURE — 3331090002 HH PPS REVENUE DEBIT

## 2017-02-26 PROCEDURE — 3331090001 HH PPS REVENUE CREDIT

## 2017-02-27 ENCOUNTER — HOME CARE VISIT (OUTPATIENT)
Dept: SCHEDULING | Facility: HOME HEALTH | Age: 82
End: 2017-02-27
Payer: MEDICARE

## 2017-02-27 VITALS — DIASTOLIC BLOOD PRESSURE: 78 MMHG | HEART RATE: 51 BPM | SYSTOLIC BLOOD PRESSURE: 124 MMHG | OXYGEN SATURATION: 98 %

## 2017-02-27 PROCEDURE — 3331090002 HH PPS REVENUE DEBIT

## 2017-02-27 PROCEDURE — G0157 HHC PT ASSISTANT EA 15: HCPCS

## 2017-02-27 PROCEDURE — 3331090001 HH PPS REVENUE CREDIT

## 2017-02-28 ENCOUNTER — HOME CARE VISIT (OUTPATIENT)
Dept: HOME HEALTH SERVICES | Facility: HOME HEALTH | Age: 82
End: 2017-02-28
Payer: MEDICARE

## 2017-02-28 PROCEDURE — 3331090001 HH PPS REVENUE CREDIT

## 2017-02-28 PROCEDURE — 3331090002 HH PPS REVENUE DEBIT

## 2017-03-01 ENCOUNTER — HOME CARE VISIT (OUTPATIENT)
Dept: SCHEDULING | Facility: HOME HEALTH | Age: 82
End: 2017-03-01
Payer: MEDICARE

## 2017-03-01 VITALS — SYSTOLIC BLOOD PRESSURE: 110 MMHG | OXYGEN SATURATION: 96 % | DIASTOLIC BLOOD PRESSURE: 78 MMHG | HEART RATE: 63 BPM

## 2017-03-01 PROCEDURE — 3331090001 HH PPS REVENUE CREDIT

## 2017-03-01 PROCEDURE — G0157 HHC PT ASSISTANT EA 15: HCPCS

## 2017-03-01 PROCEDURE — 3331090002 HH PPS REVENUE DEBIT

## 2017-03-02 ENCOUNTER — HOME CARE VISIT (OUTPATIENT)
Dept: SCHEDULING | Facility: HOME HEALTH | Age: 82
End: 2017-03-02
Payer: MEDICARE

## 2017-03-02 ENCOUNTER — HOME CARE VISIT (OUTPATIENT)
Dept: HOME HEALTH SERVICES | Facility: HOME HEALTH | Age: 82
End: 2017-03-02
Payer: MEDICARE

## 2017-03-02 PROCEDURE — 3331090002 HH PPS REVENUE DEBIT

## 2017-03-02 PROCEDURE — 3331090001 HH PPS REVENUE CREDIT

## 2017-03-02 PROCEDURE — G0151 HHCP-SERV OF PT,EA 15 MIN: HCPCS

## 2017-03-03 VITALS — OXYGEN SATURATION: 98 % | HEART RATE: 63 BPM | SYSTOLIC BLOOD PRESSURE: 110 MMHG | DIASTOLIC BLOOD PRESSURE: 70 MMHG

## 2017-03-03 PROCEDURE — 3331090002 HH PPS REVENUE DEBIT

## 2017-03-03 PROCEDURE — 3331090001 HH PPS REVENUE CREDIT

## 2017-03-04 PROCEDURE — 3331090001 HH PPS REVENUE CREDIT

## 2017-03-04 PROCEDURE — 3331090002 HH PPS REVENUE DEBIT

## 2017-03-05 PROCEDURE — 3331090002 HH PPS REVENUE DEBIT

## 2017-03-05 PROCEDURE — 3331090001 HH PPS REVENUE CREDIT

## 2017-03-06 ENCOUNTER — HOME CARE VISIT (OUTPATIENT)
Dept: SCHEDULING | Facility: HOME HEALTH | Age: 82
End: 2017-03-06
Payer: MEDICARE

## 2017-03-06 VITALS — SYSTOLIC BLOOD PRESSURE: 98 MMHG | OXYGEN SATURATION: 98 % | DIASTOLIC BLOOD PRESSURE: 68 MMHG | HEART RATE: 55 BPM

## 2017-03-06 PROCEDURE — 3331090001 HH PPS REVENUE CREDIT

## 2017-03-06 PROCEDURE — G0157 HHC PT ASSISTANT EA 15: HCPCS

## 2017-03-06 PROCEDURE — 3331090002 HH PPS REVENUE DEBIT

## 2017-03-07 PROCEDURE — 3331090002 HH PPS REVENUE DEBIT

## 2017-03-07 PROCEDURE — 3331090001 HH PPS REVENUE CREDIT

## 2017-03-08 ENCOUNTER — HOME CARE VISIT (OUTPATIENT)
Dept: SCHEDULING | Facility: HOME HEALTH | Age: 82
End: 2017-03-08
Payer: MEDICARE

## 2017-03-08 VITALS — DIASTOLIC BLOOD PRESSURE: 60 MMHG | SYSTOLIC BLOOD PRESSURE: 90 MMHG | HEART RATE: 65 BPM | OXYGEN SATURATION: 98 %

## 2017-03-08 PROCEDURE — G0157 HHC PT ASSISTANT EA 15: HCPCS

## 2017-03-08 PROCEDURE — 3331090001 HH PPS REVENUE CREDIT

## 2017-03-08 PROCEDURE — 3331090002 HH PPS REVENUE DEBIT

## 2017-03-09 ENCOUNTER — HOME CARE VISIT (OUTPATIENT)
Dept: SCHEDULING | Facility: HOME HEALTH | Age: 82
End: 2017-03-09
Payer: MEDICARE

## 2017-03-09 VITALS — HEART RATE: 51 BPM | SYSTOLIC BLOOD PRESSURE: 118 MMHG | DIASTOLIC BLOOD PRESSURE: 72 MMHG | OXYGEN SATURATION: 96 %

## 2017-03-09 PROCEDURE — 3331090001 HH PPS REVENUE CREDIT

## 2017-03-09 PROCEDURE — G0157 HHC PT ASSISTANT EA 15: HCPCS

## 2017-03-09 PROCEDURE — 3331090002 HH PPS REVENUE DEBIT

## 2017-03-10 PROCEDURE — 3331090001 HH PPS REVENUE CREDIT

## 2017-03-10 PROCEDURE — 3331090002 HH PPS REVENUE DEBIT

## 2017-03-11 PROCEDURE — 3331090002 HH PPS REVENUE DEBIT

## 2017-03-11 PROCEDURE — 3331090001 HH PPS REVENUE CREDIT

## 2017-03-12 PROCEDURE — 3331090001 HH PPS REVENUE CREDIT

## 2017-03-12 PROCEDURE — 3331090002 HH PPS REVENUE DEBIT

## 2017-03-13 ENCOUNTER — HOME CARE VISIT (OUTPATIENT)
Dept: SCHEDULING | Facility: HOME HEALTH | Age: 82
End: 2017-03-13
Payer: MEDICARE

## 2017-03-13 VITALS — DIASTOLIC BLOOD PRESSURE: 64 MMHG | OXYGEN SATURATION: 96 % | HEART RATE: 58 BPM | SYSTOLIC BLOOD PRESSURE: 106 MMHG

## 2017-03-13 PROCEDURE — 3331090002 HH PPS REVENUE DEBIT

## 2017-03-13 PROCEDURE — G0157 HHC PT ASSISTANT EA 15: HCPCS

## 2017-03-13 PROCEDURE — 3331090001 HH PPS REVENUE CREDIT

## 2017-03-14 ENCOUNTER — HOME CARE VISIT (OUTPATIENT)
Dept: HOME HEALTH SERVICES | Facility: HOME HEALTH | Age: 82
End: 2017-03-14
Payer: MEDICARE

## 2017-03-14 PROCEDURE — 3331090002 HH PPS REVENUE DEBIT

## 2017-03-14 PROCEDURE — 3331090001 HH PPS REVENUE CREDIT

## 2017-03-15 ENCOUNTER — HOME CARE VISIT (OUTPATIENT)
Dept: SCHEDULING | Facility: HOME HEALTH | Age: 82
End: 2017-03-15
Payer: MEDICARE

## 2017-03-15 PROCEDURE — G0151 HHCP-SERV OF PT,EA 15 MIN: HCPCS

## 2017-03-15 PROCEDURE — 3331090002 HH PPS REVENUE DEBIT

## 2017-03-15 PROCEDURE — 3331090001 HH PPS REVENUE CREDIT

## 2017-03-16 PROCEDURE — 3331090002 HH PPS REVENUE DEBIT

## 2017-03-16 PROCEDURE — 3331090001 HH PPS REVENUE CREDIT

## 2017-03-17 PROCEDURE — 3331090002 HH PPS REVENUE DEBIT

## 2017-03-17 PROCEDURE — 3331090001 HH PPS REVENUE CREDIT

## 2017-03-18 PROCEDURE — 3331090001 HH PPS REVENUE CREDIT

## 2017-03-18 PROCEDURE — 3331090002 HH PPS REVENUE DEBIT

## 2017-03-19 PROCEDURE — 3331090001 HH PPS REVENUE CREDIT

## 2017-03-19 PROCEDURE — 3331090002 HH PPS REVENUE DEBIT

## 2017-03-20 PROCEDURE — 3331090002 HH PPS REVENUE DEBIT

## 2017-03-20 PROCEDURE — 3331090001 HH PPS REVENUE CREDIT

## 2017-03-21 PROCEDURE — 3331090001 HH PPS REVENUE CREDIT

## 2017-03-21 PROCEDURE — 3331090002 HH PPS REVENUE DEBIT

## 2017-03-22 PROCEDURE — 3331090002 HH PPS REVENUE DEBIT

## 2017-03-22 PROCEDURE — 3331090001 HH PPS REVENUE CREDIT

## 2017-03-23 PROCEDURE — 3331090001 HH PPS REVENUE CREDIT

## 2017-03-23 PROCEDURE — 3331090002 HH PPS REVENUE DEBIT

## 2017-03-24 PROCEDURE — 3331090002 HH PPS REVENUE DEBIT

## 2017-03-24 PROCEDURE — 3331090001 HH PPS REVENUE CREDIT

## 2017-03-25 PROCEDURE — 3331090002 HH PPS REVENUE DEBIT

## 2017-03-25 PROCEDURE — 3331090001 HH PPS REVENUE CREDIT

## 2017-03-26 PROCEDURE — 3331090001 HH PPS REVENUE CREDIT

## 2017-03-26 PROCEDURE — 3331090002 HH PPS REVENUE DEBIT

## 2017-03-27 PROCEDURE — 3331090002 HH PPS REVENUE DEBIT

## 2017-03-27 PROCEDURE — 3331090001 HH PPS REVENUE CREDIT

## 2017-03-28 PROCEDURE — 3331090001 HH PPS REVENUE CREDIT

## 2017-03-28 PROCEDURE — 3331090002 HH PPS REVENUE DEBIT

## 2017-03-29 PROCEDURE — 3331090002 HH PPS REVENUE DEBIT

## 2017-03-29 PROCEDURE — 3331090001 HH PPS REVENUE CREDIT

## 2017-04-19 ENCOUNTER — APPOINTMENT (OUTPATIENT)
Dept: GENERAL RADIOLOGY | Age: 82
DRG: 643 | End: 2017-04-19
Attending: EMERGENCY MEDICINE
Payer: MEDICARE

## 2017-04-19 ENCOUNTER — HOSPITAL ENCOUNTER (INPATIENT)
Age: 82
LOS: 6 days | Discharge: NURSING, REHAB OR LONG-TERM CARE | DRG: 643 | End: 2017-04-26
Attending: EMERGENCY MEDICINE | Admitting: FAMILY MEDICINE
Payer: MEDICARE

## 2017-04-19 DIAGNOSIS — T68.XXXA HYPOTHERMIA, INITIAL ENCOUNTER: ICD-10-CM

## 2017-04-19 DIAGNOSIS — J69.0 ASPIRATION PNEUMONIA DUE TO VOMIT, UNSPECIFIED LATERALITY, UNSPECIFIED PART OF LUNG (HCC): Primary | ICD-10-CM

## 2017-04-19 LAB
ALBUMIN SERPL BCP-MCNC: 2.9 G/DL (ref 3.4–5)
ALBUMIN/GLOB SERPL: 0.9 {RATIO} (ref 0.8–1.7)
ALP SERPL-CCNC: 124 U/L (ref 45–117)
ALT SERPL-CCNC: 59 U/L (ref 13–56)
ANION GAP BLD CALC-SCNC: 5 MMOL/L (ref 3–18)
APPEARANCE UR: CLEAR
AST SERPL W P-5'-P-CCNC: 60 U/L (ref 15–37)
BACTERIA URNS QL MICRO: ABNORMAL /HPF
BASOPHILS # BLD AUTO: 0 K/UL (ref 0–0.06)
BASOPHILS # BLD: 0 % (ref 0–2)
BILIRUB SERPL-MCNC: 0.4 MG/DL (ref 0.2–1)
BILIRUB UR QL: NEGATIVE
BUN SERPL-MCNC: 24 MG/DL (ref 7–18)
BUN/CREAT SERPL: 28 (ref 12–20)
CALCIUM SERPL-MCNC: 9.6 MG/DL (ref 8.5–10.1)
CHLORIDE SERPL-SCNC: 105 MMOL/L (ref 100–108)
CO2 SERPL-SCNC: 33 MMOL/L (ref 21–32)
COLOR UR: ABNORMAL
CREAT SERPL-MCNC: 0.85 MG/DL (ref 0.6–1.3)
DIFFERENTIAL METHOD BLD: ABNORMAL
EOSINOPHIL # BLD: 0.1 K/UL (ref 0–0.4)
EOSINOPHIL NFR BLD: 1 % (ref 0–5)
EPITH CASTS URNS QL MICRO: ABNORMAL /LPF (ref 0–5)
ERYTHROCYTE [DISTWIDTH] IN BLOOD BY AUTOMATED COUNT: 15.1 % (ref 11.6–14.5)
GLOBULIN SER CALC-MCNC: 3.4 G/DL (ref 2–4)
GLUCOSE SERPL-MCNC: 128 MG/DL (ref 74–99)
GLUCOSE UR STRIP.AUTO-MCNC: NEGATIVE MG/DL
HCT VFR BLD AUTO: 32.2 % (ref 35–45)
HGB BLD-MCNC: 10.4 G/DL (ref 12–16)
HGB UR QL STRIP: NEGATIVE
KETONES UR QL STRIP.AUTO: NEGATIVE MG/DL
LACTATE SERPL-SCNC: 0.8 MMOL/L (ref 0.4–2)
LEUKOCYTE ESTERASE UR QL STRIP.AUTO: ABNORMAL
LYMPHOCYTES # BLD AUTO: 19 % (ref 21–52)
LYMPHOCYTES # BLD: 0.8 K/UL (ref 0.9–3.6)
MCH RBC QN AUTO: 31 PG (ref 24–34)
MCHC RBC AUTO-ENTMCNC: 32.3 G/DL (ref 31–37)
MCV RBC AUTO: 96.1 FL (ref 74–97)
MONOCYTES # BLD: 0.5 K/UL (ref 0.05–1.2)
MONOCYTES NFR BLD AUTO: 12 % (ref 3–10)
NEUTS SEG # BLD: 2.9 K/UL (ref 1.8–8)
NEUTS SEG NFR BLD AUTO: 68 % (ref 40–73)
NITRITE UR QL STRIP.AUTO: NEGATIVE
PH UR STRIP: 6.5 [PH] (ref 5–8)
PLATELET # BLD AUTO: 132 K/UL (ref 135–420)
PMV BLD AUTO: 11.6 FL (ref 9.2–11.8)
POTASSIUM SERPL-SCNC: 4.8 MMOL/L (ref 3.5–5.5)
PROT SERPL-MCNC: 6.3 G/DL (ref 6.4–8.2)
PROT UR STRIP-MCNC: NEGATIVE MG/DL
RBC # BLD AUTO: 3.35 M/UL (ref 4.2–5.3)
RBC #/AREA URNS HPF: ABNORMAL /HPF (ref 0–5)
SODIUM SERPL-SCNC: 143 MMOL/L (ref 136–145)
SP GR UR REFRACTOMETRY: 1.02 (ref 1–1.03)
T3FREE SERPL-MCNC: 2 PG/ML (ref 2.3–4.2)
TSH SERPL DL<=0.05 MIU/L-ACNC: 21.35 UIU/ML (ref 0.36–3.74)
UROBILINOGEN UR QL STRIP.AUTO: 1 EU/DL (ref 0.2–1)
WBC # BLD AUTO: 4.4 K/UL (ref 4.6–13.2)
WBC URNS QL MICRO: ABNORMAL /HPF (ref 0–4)

## 2017-04-19 PROCEDURE — 85025 COMPLETE CBC W/AUTO DIFF WBC: CPT | Performed by: EMERGENCY MEDICINE

## 2017-04-19 PROCEDURE — 81001 URINALYSIS AUTO W/SCOPE: CPT | Performed by: EMERGENCY MEDICINE

## 2017-04-19 PROCEDURE — 96368 THER/DIAG CONCURRENT INF: CPT

## 2017-04-19 PROCEDURE — 87040 BLOOD CULTURE FOR BACTERIA: CPT | Performed by: EMERGENCY MEDICINE

## 2017-04-19 PROCEDURE — 93005 ELECTROCARDIOGRAM TRACING: CPT

## 2017-04-19 PROCEDURE — 80053 COMPREHEN METABOLIC PANEL: CPT | Performed by: EMERGENCY MEDICINE

## 2017-04-19 PROCEDURE — 83605 ASSAY OF LACTIC ACID: CPT | Performed by: EMERGENCY MEDICINE

## 2017-04-19 PROCEDURE — 96365 THER/PROPH/DIAG IV INF INIT: CPT

## 2017-04-19 PROCEDURE — 74011250637 HC RX REV CODE- 250/637: Performed by: EMERGENCY MEDICINE

## 2017-04-19 PROCEDURE — 74011000258 HC RX REV CODE- 258: Performed by: EMERGENCY MEDICINE

## 2017-04-19 PROCEDURE — 74011250636 HC RX REV CODE- 250/636: Performed by: EMERGENCY MEDICINE

## 2017-04-19 PROCEDURE — 86376 MICROSOMAL ANTIBODY EACH: CPT | Performed by: EMERGENCY MEDICINE

## 2017-04-19 PROCEDURE — 71010 XR CHEST PORT: CPT

## 2017-04-19 PROCEDURE — 84481 FREE ASSAY (FT-3): CPT | Performed by: EMERGENCY MEDICINE

## 2017-04-19 PROCEDURE — 84443 ASSAY THYROID STIM HORMONE: CPT | Performed by: EMERGENCY MEDICINE

## 2017-04-19 PROCEDURE — 99285 EMERGENCY DEPT VISIT HI MDM: CPT

## 2017-04-19 PROCEDURE — 77030013079 HC BLNKT BAIR HGGR 3M -A

## 2017-04-19 RX ORDER — SODIUM CHLORIDE 0.9 % (FLUSH) 0.9 %
5-10 SYRINGE (ML) INJECTION AS NEEDED
Status: DISCONTINUED | OUTPATIENT
Start: 2017-04-19 | End: 2017-04-26 | Stop reason: HOSPADM

## 2017-04-19 RX ORDER — LEVOFLOXACIN 5 MG/ML
750 INJECTION, SOLUTION INTRAVENOUS EVERY 24 HOURS
Status: DISCONTINUED | OUTPATIENT
Start: 2017-04-19 | End: 2017-04-21

## 2017-04-19 RX ORDER — DEXAMETHASONE SODIUM PHOSPHATE 4 MG/ML
12 INJECTION, SOLUTION INTRA-ARTICULAR; INTRALESIONAL; INTRAMUSCULAR; INTRAVENOUS; SOFT TISSUE
Status: COMPLETED | OUTPATIENT
Start: 2017-04-19 | End: 2017-04-19

## 2017-04-19 RX ORDER — LEVOTHYROXINE SODIUM 100 UG/1
100 TABLET ORAL
Status: COMPLETED | OUTPATIENT
Start: 2017-04-19 | End: 2017-04-19

## 2017-04-19 RX ORDER — SODIUM CHLORIDE 9 MG/ML
100 INJECTION, SOLUTION INTRAVENOUS ONCE
Status: COMPLETED | OUTPATIENT
Start: 2017-04-19 | End: 2017-04-20

## 2017-04-19 RX ADMIN — SODIUM CHLORIDE 1905 ML: 900 INJECTION, SOLUTION INTRAVENOUS at 17:58

## 2017-04-19 RX ADMIN — SODIUM CHLORIDE 100 ML/HR: 900 INJECTION, SOLUTION INTRAVENOUS at 21:30

## 2017-04-19 RX ADMIN — LEVOFLOXACIN 750 MG: 5 INJECTION, SOLUTION INTRAVENOUS at 18:36

## 2017-04-19 RX ADMIN — DEXAMETHASONE SODIUM PHOSPHATE 12 MG: 4 INJECTION, SOLUTION INTRAMUSCULAR; INTRAVENOUS at 21:03

## 2017-04-19 RX ADMIN — CEFEPIME HYDROCHLORIDE 1 G: 1 INJECTION, POWDER, FOR SOLUTION INTRAMUSCULAR; INTRAVENOUS at 17:58

## 2017-04-19 RX ADMIN — LEVOTHYROXINE SODIUM 100 MCG: 100 TABLET ORAL at 22:00

## 2017-04-19 NOTE — ED PROVIDER NOTES
HPI Comments: 5:00 PM Reinaldo Martinez is a 80 y.o. female w/ hx of dementia who presents to the ED, via EMS, for evaluation post fall. Pt's daughter states that the pt slipped down onto the carpet this AM due to leg weakness. Daughter was present. Pt started c/o leg pain this afternoon. Pt has not been walking as well as normal. Pt's daughter also states that the pt has been more confused than normal. Per EMS, pt has had multiple documented falls this week. Pt's daughter denies LOC, head trauma, and abd pain. There are no other sx or complaints. HIP provided by Pt's daughter. The history is provided by a relative. Past Medical History:   Diagnosis Date    Neurological disorder     dementia    Pneumonia        Past Surgical History:   Procedure Laterality Date    HX APPENDECTOMY           No family history on file. Social History     Social History    Marital status:      Spouse name: N/A    Number of children: N/A    Years of education: N/A     Occupational History    Not on file. Social History Main Topics    Smoking status: Never Smoker    Smokeless tobacco: Never Used    Alcohol use No    Drug use: No    Sexual activity: No     Other Topics Concern    Not on file     Social History Narrative         ALLERGIES: Review of patient's allergies indicates no known allergies. Review of Systems   Unable to perform ROS: Dementia       Vitals:    04/19/17 1614 04/19/17 1700 04/19/17 1800 04/19/17 1830   BP: 126/57 117/62 118/58 130/62   Pulse: (!) 52 (!) 51 (!) 56 (!) 59   Resp: 16 12 12 13   Temp:  (!) 92 °F (33.3 °C)  (!) 92.5 °F (33.6 °C)   SpO2:  100% 100% 100%   Weight: 63.5 kg (140 lb)      Height: 5' 3\" (1.6 m)               Physical Exam   Constitutional: She appears well-developed and well-nourished. She appears lethargic. She is easily aroused. Non-toxic appearance. She does not have a sickly appearance. She does not appear ill. No distress.    Under bare warmer  Answers questions appropriately    HENT:   Head: Normocephalic and atraumatic. Mouth/Throat: Oropharynx is clear and moist. No oropharyngeal exudate. Eyes: Conjunctivae and EOM are normal. Pupils are equal, round, and reactive to light. No scleral icterus. Neck: Trachea normal and normal range of motion. Neck supple. No hepatojugular reflux and no JVD present. No tracheal deviation present. No thyromegaly present. Cardiovascular: Regular rhythm, S1 normal, S2 normal, normal heart sounds, intact distal pulses and normal pulses. Bradycardia present. Exam reveals no gallop, no S3 and no S4. No murmur heard. Pulses:       Radial pulses are 2+ on the right side, and 2+ on the left side. Dorsalis pedis pulses are 2+ on the right side, and 2+ on the left side. Pulmonary/Chest: Effort normal and breath sounds normal. No accessory muscle usage. No respiratory distress. She has no decreased breath sounds. She has no wheezes. She has no rhonchi. She has no rales. Abdominal: Soft. Normal appearance and bowel sounds are normal. She exhibits no distension and no mass. There is no hepatosplenomegaly. There is no tenderness. There is no rigidity, no rebound, no guarding, no CVA tenderness, no tenderness at McBurney's point and negative Kohli's sign. Musculoskeletal: Normal range of motion. Strength 4/5 throughout    Lymphadenopathy:        Head (right side): No submental, no submandibular, no preauricular and no occipital adenopathy present. Head (left side): No submental, no submandibular, no preauricular and no occipital adenopathy present. She has no cervical adenopathy. Right: No supraclavicular adenopathy present. Left: No supraclavicular adenopathy present. Neurological: She is easily aroused. She has normal strength and normal reflexes. She appears lethargic. She is not disoriented. No cranial nerve deficit or sensory deficit.  Coordination and gait normal. GCS eye subscore is 4. GCS verbal subscore is 5. GCS motor subscore is 6. Grossly intact    Skin: Skin is warm, dry and intact. No rash noted. She is not diaphoretic. Psychiatric: She has a normal mood and affect. Her speech is normal and behavior is normal. Judgment and thought content normal. Cognition and memory are normal.   Nursing note and vitals reviewed. MDM  Number of Diagnoses or Management Options  Aspiration pneumonia due to vomit, unspecified laterality, unspecified part of lung ([facility] [address]):   Hypothermia, initial encounter:   Diagnosis management comments: UTI  Pneumonia   Neoplasm       ED Course       Procedures    Labs showed WBC 4.4; hemoglobin 10.4; platelets of 143; straight cath urine showed trace leukocytes; electrolytes normal; lactic acid 0.8; blood cultures pending. Chest X-Ray showed Atelectasis vs infiltrate. Repeat temp 6:44 PM 92.5 F after an 1.45 hr/min of raul hugger. EKG showed Sinus bradycardia with rate of 50 bpm. With no ST elevations or depression. Repeat HR of 61 bpm at 6:45 PM.  6:36 PM 4/19/2017       Consult:  Discussed care with Dr. Arabella Chamberlain, hospitalist. Standard discussion; including history of patients chief complaint, available diagnostic results, and treatment course. Agrees to admit  6:58 PM, 4/19/2017     Scribe Attestation:   I, Torrey Darling, am scribing for and in the presence of Angie Coleman DO on this day 04/19/17 at 5:00 PM   marilyn Garcia    Provider Attestation:  I personally performed the services described in the documentation, reviewed the documentation, as recorded by the scribe in my presence, and it accurately and completely records my words and actions.   Nilesh Pike DO. 5:00 PM      Signed by: Marilyn Garcia, 5:00 PM

## 2017-04-19 NOTE — ED TRIAGE NOTES
Per family pt not eating and drinking well for the past week, pt states she is tired, EMS states they have made many trips this past week for assist from falls to her home

## 2017-04-20 LAB
ALBUMIN SERPL BCP-MCNC: 2.5 G/DL (ref 3.4–5)
ALBUMIN/GLOB SERPL: 0.8 {RATIO} (ref 0.8–1.7)
ALP SERPL-CCNC: 110 U/L (ref 45–117)
ALT SERPL-CCNC: 51 U/L (ref 13–56)
ANION GAP BLD CALC-SCNC: 6 MMOL/L (ref 3–18)
AST SERPL W P-5'-P-CCNC: 54 U/L (ref 15–37)
ATRIAL RATE: 50 BPM
BASOPHILS # BLD AUTO: 0 K/UL (ref 0–0.06)
BASOPHILS # BLD: 0 % (ref 0–2)
BILIRUB SERPL-MCNC: 0.4 MG/DL (ref 0.2–1)
BUN SERPL-MCNC: 28 MG/DL (ref 7–18)
BUN/CREAT SERPL: 35 (ref 12–20)
CALCIUM SERPL-MCNC: 8.5 MG/DL (ref 8.5–10.1)
CALCULATED P AXIS, ECG09: 44 DEGREES
CALCULATED R AXIS, ECG10: -33 DEGREES
CALCULATED T AXIS, ECG11: 27 DEGREES
CHLORIDE SERPL-SCNC: 106 MMOL/L (ref 100–108)
CO2 SERPL-SCNC: 29 MMOL/L (ref 21–32)
CREAT SERPL-MCNC: 0.79 MG/DL (ref 0.6–1.3)
DIAGNOSIS, 93000: NORMAL
DIFFERENTIAL METHOD BLD: ABNORMAL
EOSINOPHIL # BLD: 0 K/UL (ref 0–0.4)
EOSINOPHIL NFR BLD: 0 % (ref 0–5)
ERYTHROCYTE [DISTWIDTH] IN BLOOD BY AUTOMATED COUNT: 15.1 % (ref 11.6–14.5)
GLOBULIN SER CALC-MCNC: 3.1 G/DL (ref 2–4)
GLUCOSE SERPL-MCNC: 116 MG/DL (ref 74–99)
HCT VFR BLD AUTO: 32 % (ref 35–45)
HGB BLD-MCNC: 10.5 G/DL (ref 12–16)
LYMPHOCYTES # BLD AUTO: 12 % (ref 21–52)
LYMPHOCYTES # BLD: 0.4 K/UL (ref 0.9–3.6)
MCH RBC QN AUTO: 31.4 PG (ref 24–34)
MCHC RBC AUTO-ENTMCNC: 32.8 G/DL (ref 31–37)
MCV RBC AUTO: 95.8 FL (ref 74–97)
MONOCYTES # BLD: 0 K/UL (ref 0.05–1.2)
MONOCYTES NFR BLD AUTO: 1 % (ref 3–10)
NEUTS SEG # BLD: 2.9 K/UL (ref 1.8–8)
NEUTS SEG NFR BLD AUTO: 87 % (ref 40–73)
P-R INTERVAL, ECG05: 178 MS
PLATELET # BLD AUTO: 151 K/UL (ref 135–420)
PMV BLD AUTO: 11 FL (ref 9.2–11.8)
POTASSIUM SERPL-SCNC: 5 MMOL/L (ref 3.5–5.5)
PROT SERPL-MCNC: 5.6 G/DL (ref 6.4–8.2)
Q-T INTERVAL, ECG07: 454 MS
QRS DURATION, ECG06: 92 MS
QTC CALCULATION (BEZET), ECG08: 413 MS
RBC # BLD AUTO: 3.34 M/UL (ref 4.2–5.3)
SODIUM SERPL-SCNC: 141 MMOL/L (ref 136–145)
VENTRICULAR RATE, ECG03: 50 BPM
WBC # BLD AUTO: 3.3 K/UL (ref 4.6–13.2)

## 2017-04-20 PROCEDURE — 74011250637 HC RX REV CODE- 250/637: Performed by: EMERGENCY MEDICINE

## 2017-04-20 PROCEDURE — 80053 COMPREHEN METABOLIC PANEL: CPT | Performed by: EMERGENCY MEDICINE

## 2017-04-20 PROCEDURE — 74011250636 HC RX REV CODE- 250/636: Performed by: EMERGENCY MEDICINE

## 2017-04-20 PROCEDURE — 74011000258 HC RX REV CODE- 258: Performed by: EMERGENCY MEDICINE

## 2017-04-20 PROCEDURE — 85025 COMPLETE CBC W/AUTO DIFF WBC: CPT | Performed by: EMERGENCY MEDICINE

## 2017-04-20 PROCEDURE — A6209 FOAM DRSG <=16 SQ IN W/O BDR: HCPCS

## 2017-04-20 PROCEDURE — 74011250637 HC RX REV CODE- 250/637: Performed by: HOSPITALIST

## 2017-04-20 PROCEDURE — 65660000004 HC RM CVT STEPDOWN

## 2017-04-20 RX ORDER — LORAZEPAM 0.5 MG/1
0.5 TABLET ORAL
Status: DISCONTINUED | OUTPATIENT
Start: 2017-04-20 | End: 2017-04-26 | Stop reason: HOSPADM

## 2017-04-20 RX ORDER — ALPRAZOLAM 0.25 MG/1
0.25 TABLET ORAL
Status: COMPLETED | OUTPATIENT
Start: 2017-04-20 | End: 2017-04-20

## 2017-04-20 RX ORDER — LORAZEPAM 1 MG/1
0.5 TABLET ORAL
Status: DISCONTINUED | OUTPATIENT
Start: 2017-04-20 | End: 2017-04-20

## 2017-04-20 RX ADMIN — CEFEPIME HYDROCHLORIDE 1 G: 1 INJECTION, POWDER, FOR SOLUTION INTRAMUSCULAR; INTRAVENOUS at 05:00

## 2017-04-20 RX ADMIN — CEFEPIME HYDROCHLORIDE 1 G: 1 INJECTION, POWDER, FOR SOLUTION INTRAMUSCULAR; INTRAVENOUS at 17:00

## 2017-04-20 RX ADMIN — LEVOFLOXACIN 750 MG: 5 INJECTION, SOLUTION INTRAVENOUS at 17:46

## 2017-04-20 RX ADMIN — LORAZEPAM 0.5 MG: 1 TABLET ORAL at 15:04

## 2017-04-20 RX ADMIN — ALPRAZOLAM 0.25 MG: 0.25 TABLET ORAL at 05:14

## 2017-04-20 NOTE — PROGRESS NOTES
conducted an initial consultation and Spiritual Assessment for Michelle Rhodes, who is a 80 y.o.,female. Patients Primary Language is: [place]. According to the patients EMR Gnosticist Affiliation is: Buddhism.     The reason the Patient came to the hospital is:   Patient Active Problem List    Diagnosis Date Noted    Hypothermia 04/19/2017    Aspiration pneumonia ([facility] [address] 04/19/2017    CVA (cerebral vascular accident) ([facility] [address] 01/09/2017        The  provided the following Interventions:  Initiated a relationship of care and support. Listened empathically. Offered prayer and assurance of continued prayers on patient's behalf. Chart reviewed. The following outcomes where achieved:  Patient processed feeling about current hospitalization. Patient expressed gratitude for 's visit. Assessment:  Patient does not have any Hoahaoism/cultural needs that will affect patients preferences in health care. There are no spiritual or Hoahaoism issues which require intervention at this time. Plan:  Chaplains will continue to follow and will provide pastoral care on an as needed/requested basis.  recommends bedside caregivers page  on duty if patient shows signs of acute spiritual or emotional distress.       [address] [facility]   [phone]

## 2017-04-20 NOTE — ED NOTES
Spoke with Hospitalist, Dr. Umer Iverson regarding additional admission orders for patient since bed remains unavailable.  Orders received

## 2017-04-20 NOTE — ED NOTES
Verbal shift change report given to Fabiola Fay (oncoming nurse) by Jessee Snell RN (offgoing nurse). Report included the following information SBAR, Kardex and ED Summary.

## 2017-04-20 NOTE — ED NOTES
Pt and pts daughter updated on plan of care. Pt awake and resting in bed.   No concerns expressed at this time

## 2017-04-20 NOTE — ED NOTES
Assumed care of pt in bed 8 from [facility]. Pt resting with family at bedside. Pt awaiting inpatient bed assignment at [facility].

## 2017-04-20 NOTE — PROGRESS NOTES
Orders received and discontinued as pt is currently at [facility] ED. Please re-consult when pt is admitted to [facility].      Thank you for this referral.    Vilma Corral M.S. CCC-SLP/L  Speech-Language Pathologist

## 2017-04-20 NOTE — ED NOTES
Received care of pt. First pt contact  Introduced self to pt and daughter   Pt oriented to surroundings   Pt remains on cardiac monitor.  VSS   Daughter aware we are awaiting a room at [facility] and daughter aware to let nurse know if they need anything  No complaints voiced

## 2017-04-20 NOTE — ED NOTES
Pt transferred to larger bed for comfort while on hold in ED. Pt remains pleasant without distress or complaints. Daughter at bedside.

## 2017-04-21 PROBLEM — G93.40 ENCEPHALOPATHY ACUTE: Status: ACTIVE | Noted: 2017-04-21

## 2017-04-21 LAB
FOLATE SERPL-MCNC: >20 NG/ML (ref 3.1–17.5)
TSH SERPL DL<=0.05 MIU/L-ACNC: 26.2 UIU/ML (ref 0.36–3.74)
VIT B12 SERPL-MCNC: >2000 PG/ML (ref 211–911)

## 2017-04-21 PROCEDURE — 84443 ASSAY THYROID STIM HORMONE: CPT | Performed by: INTERNAL MEDICINE

## 2017-04-21 PROCEDURE — 65660000004 HC RM CVT STEPDOWN

## 2017-04-21 PROCEDURE — 74011250637 HC RX REV CODE- 250/637: Performed by: HOSPITALIST

## 2017-04-21 PROCEDURE — 94760 N-INVAS EAR/PLS OXIMETRY 1: CPT

## 2017-04-21 PROCEDURE — 82607 VITAMIN B-12: CPT | Performed by: HOSPITALIST

## 2017-04-21 PROCEDURE — 77010033678 HC OXYGEN DAILY

## 2017-04-21 PROCEDURE — 74011250636 HC RX REV CODE- 250/636: Performed by: INTERNAL MEDICINE

## 2017-04-21 PROCEDURE — 74011250637 HC RX REV CODE- 250/637: Performed by: INTERNAL MEDICINE

## 2017-04-21 PROCEDURE — 36415 COLL VENOUS BLD VENIPUNCTURE: CPT | Performed by: INTERNAL MEDICINE

## 2017-04-21 PROCEDURE — 86376 MICROSOMAL ANTIBODY EACH: CPT | Performed by: HOSPITALIST

## 2017-04-21 PROCEDURE — 82306 VITAMIN D 25 HYDROXY: CPT | Performed by: HOSPITALIST

## 2017-04-21 PROCEDURE — 74011000250 HC RX REV CODE- 250: Performed by: INTERNAL MEDICINE

## 2017-04-21 PROCEDURE — 99218 HC RM OBSERVATION: CPT

## 2017-04-21 RX ORDER — TIMOLOL MALEATE 5 MG/ML
1 SOLUTION/ DROPS OPHTHALMIC DAILY
Status: DISCONTINUED | OUTPATIENT
Start: 2017-04-21 | End: 2017-04-26 | Stop reason: HOSPADM

## 2017-04-21 RX ORDER — HYDROCODONE BITARTRATE AND ACETAMINOPHEN 5; 325 MG/1; MG/1
1 TABLET ORAL
Status: DISCONTINUED | OUTPATIENT
Start: 2017-04-21 | End: 2017-04-26 | Stop reason: HOSPADM

## 2017-04-21 RX ORDER — GUAIFENESIN 100 MG/5ML
81 LIQUID (ML) ORAL DAILY
Status: DISCONTINUED | OUTPATIENT
Start: 2017-04-21 | End: 2017-04-26 | Stop reason: HOSPADM

## 2017-04-21 RX ORDER — DONEPEZIL HYDROCHLORIDE 5 MG/1
10 TABLET, FILM COATED ORAL
Status: DISCONTINUED | OUTPATIENT
Start: 2017-04-21 | End: 2017-04-26 | Stop reason: HOSPADM

## 2017-04-21 RX ORDER — HEPARIN SODIUM 5000 [USP'U]/ML
5000 INJECTION, SOLUTION INTRAVENOUS; SUBCUTANEOUS EVERY 8 HOURS
Status: DISCONTINUED | OUTPATIENT
Start: 2017-04-21 | End: 2017-04-26 | Stop reason: HOSPADM

## 2017-04-21 RX ORDER — LEVOTHYROXINE SODIUM 50 UG/1
50 TABLET ORAL
Status: DISCONTINUED | OUTPATIENT
Start: 2017-04-21 | End: 2017-04-22

## 2017-04-21 RX ORDER — SODIUM CHLORIDE 9 MG/ML
75 INJECTION, SOLUTION INTRAVENOUS CONTINUOUS
Status: DISCONTINUED | OUTPATIENT
Start: 2017-04-21 | End: 2017-04-26 | Stop reason: HOSPADM

## 2017-04-21 RX ORDER — LORAZEPAM 0.5 MG/1
0.5 TABLET ORAL
Status: DISCONTINUED | OUTPATIENT
Start: 2017-04-21 | End: 2017-04-26 | Stop reason: HOSPADM

## 2017-04-21 RX ORDER — PANTOPRAZOLE SODIUM 40 MG/1
40 TABLET, DELAYED RELEASE ORAL
Status: DISCONTINUED | OUTPATIENT
Start: 2017-04-21 | End: 2017-04-21

## 2017-04-21 RX ORDER — CALCIUM CARBONATE 500(1250)
500 TABLET ORAL DAILY
Status: DISCONTINUED | OUTPATIENT
Start: 2017-04-21 | End: 2017-04-26 | Stop reason: HOSPADM

## 2017-04-21 RX ORDER — ONDANSETRON 2 MG/ML
4 INJECTION INTRAMUSCULAR; INTRAVENOUS
Status: DISCONTINUED | OUTPATIENT
Start: 2017-04-21 | End: 2017-04-26 | Stop reason: HOSPADM

## 2017-04-21 RX ORDER — LANOLIN ALCOHOL/MO/W.PET/CERES
1000 CREAM (GRAM) TOPICAL DAILY
Status: DISCONTINUED | OUTPATIENT
Start: 2017-04-21 | End: 2017-04-26 | Stop reason: HOSPADM

## 2017-04-21 RX ORDER — ZOLPIDEM TARTRATE 5 MG/1
5 TABLET ORAL
Status: DISCONTINUED | OUTPATIENT
Start: 2017-04-21 | End: 2017-04-26 | Stop reason: HOSPADM

## 2017-04-21 RX ORDER — ACETAMINOPHEN 325 MG/1
650 TABLET ORAL
Status: DISCONTINUED | OUTPATIENT
Start: 2017-04-21 | End: 2017-04-26 | Stop reason: HOSPADM

## 2017-04-21 RX ADMIN — SODIUM CHLORIDE 75 ML/HR: 900 INJECTION, SOLUTION INTRAVENOUS at 01:00

## 2017-04-21 RX ADMIN — LORAZEPAM 0.5 MG: 1 TABLET ORAL at 03:35

## 2017-04-21 RX ADMIN — MULTI VITAMIN/MINERAL SUPPLEMENT CONTAINING VITAMIN C, VITAMIN D, VITAMIN E, THIAMIN, RIBOFLAVIN, NIACIN, PYRIDOXINE, FOLIC ACID, COBALAMIN, BIOTIN, PANTOTHENIC ACID, SELENIUM AND ZINC 1 TABLET: 60; 2000; 35; 1.5; 1.7; 20; 10; 1; 6; 300; 10; 12.5; 7 TABLET, COATED ORAL at 09:33

## 2017-04-21 RX ADMIN — DONEPEZIL HYDROCHLORIDE 10 MG: 5 TABLET, FILM COATED ORAL at 21:50

## 2017-04-21 RX ADMIN — HEPARIN SODIUM 5000 UNITS: 5000 INJECTION, SOLUTION INTRAVENOUS; SUBCUTANEOUS at 03:32

## 2017-04-21 RX ADMIN — Medication 500 MG: at 09:33

## 2017-04-21 RX ADMIN — LORAZEPAM 0.5 MG: 1 TABLET ORAL at 21:50

## 2017-04-21 RX ADMIN — TIMOLOL MALEATE 1 DROP: 5 SOLUTION OPHTHALMIC at 11:57

## 2017-04-21 RX ADMIN — CYANOCOBALAMIN TAB 1000 MCG 1000 MCG: 1000 TAB at 09:32

## 2017-04-21 RX ADMIN — PANTOPRAZOLE SODIUM 40 MG: 40 TABLET, DELAYED RELEASE ORAL at 09:33

## 2017-04-21 RX ADMIN — HEPARIN SODIUM 5000 UNITS: 5000 INJECTION, SOLUTION INTRAVENOUS; SUBCUTANEOUS at 09:42

## 2017-04-21 RX ADMIN — ASPIRIN 81 MG CHEWABLE TABLET 81 MG: 81 TABLET CHEWABLE at 09:33

## 2017-04-21 RX ADMIN — HEPARIN SODIUM 5000 UNITS: 5000 INJECTION, SOLUTION INTRAVENOUS; SUBCUTANEOUS at 17:57

## 2017-04-21 RX ADMIN — LEVOTHYROXINE SODIUM 50 MCG: 50 TABLET ORAL at 09:33

## 2017-04-21 NOTE — PROGRESS NOTES
Care Management Interventions  PCP Verified by CM: Yes (as listed)  Palliative Care Consult (Criteria: CHF and RRAT>21): No  Reason for No Palliative Care Consult: Other (see comment)  Mode of Transport at Discharge: BLS  Transition of Care Consult (CM Consult): Discharge Planning  MyChart Signup: No  Discharge Durable Medical Equipment: No (has at home rolling walker, wc, rollator, shower chair)  Health Maintenance Reviewed: Yes  Physical Therapy Consult: Yes  Occupational Therapy Consult: Yes  Speech Therapy Consult: No  Current Support Network: Own Home (pt's daughter lives with her)  Confirm Follow Up Transport: Family  Plan discussed with Pt/Family/Caregiver: Yes  Freedom of Choice Offered: Yes  Discharge Location  Discharge Placement: Skilled nursing facility     Patient was provided with [address] letters, pt's daughter/POANabeel signed it, original placed to blue paper chart, copy to patient. Patient 80years old female on cvt sd with hypothermia, aspiration PNA. Saw the patient and her daughters, Blaine Smith and Guillermo Bhakta-[phone] in room. Patient awake, alert, but very confused, observed trying to pull IV line out. Pt's daughter-Jeremy stated to be her mother's POA and agreed to speak on her mother's behalf. She stated her mother lives in her own house, pt's daughter-Radha lives with her. Patient has at home all listed above DMEs; per daughter, patient was able to [facility] very slowly\" prior to this admission. However, Claribel Shine says, her mother became progressively weaker and lately could not do much. She says, at her mother's, house doorways are very narrow they will not be able to use wheel chair inside the house. Also she stated, her sister will not be able to take total care of their mother and they are seeking for long term placement. Family were provided with SNF list to review.  They also were informed that the patient does not have long term insurance-[facility] and referral will be made to [facility]. I called, spoke to Robert Baxter, made referral for [facility] screening. CM will continue to follow and to assist in dcp needs.     HALIMA, daughter, Maggie Lacy, [phone]

## 2017-04-21 NOTE — ED NOTES
[facility] at bedside for transport to [facility] for admission.

## 2017-04-21 NOTE — H&P
[facility]  ROUTINE H AND PS    Name:  Maria Del Carmen Castano  MR#:  [id number]  :  1924  Account #:  [de-identified]  Date of Adm:  2017      CHIEF COMPLAINT: Confusion. HISTORY OF PRESENT ILLNESS: The patient is a 72-year-old white  female with an established history of dementia. She apparently has  been falling a great deal at home. She has not injured herself. Paramedics were summoned and she was told that she had a rectal  temperature of 92.5. She subsequently was transferred to this facility  for further evaluation. The patient has been demented for years. She  has an established DNR order. Her medications of use are on the  admission documentation. She has never injured herself severely in  falling. She apparently goes to sit down and misses the seat or slides  off. She has had no seizures. No history of any head trauma, etc.    REVIEW OF SYSTEMS: Unobtainable at the time I saw her, because  she was so confused. PAST MEDICAL HISTORY: She has a history of hypothyroidism,  evening confusion, and questionable history of hypothermia. SOCIAL HISTORY: She does not smoke or use alcohol. MEDICATIONS: Mentioned under admission documentation. ALLERGIES: SHE HAS NO DEFINITE ALLERGIES, EITHER  SEASONAL OR TO DRUGS. PHYSICAL EXAMINATION  GENERAL: When I saw her, she was an elderly white female who was  delightfully confused. She was not oriented to time, place, or person. VITAL SIGNS: Her temperature was 97.3 on several occasions, blood  pressure 115/71, respiratory rate was 14, and pulse was 74 and  regular. HEENT: Unremarkable. She had no evidence of head trauma. NECK: Supple. CHEST: Revealed good airway movement bilaterally. She had no  wheezes, rales, or rhonchi. CARDIAC: Revealed PMI to be in the 5th intercostal space,  midclavicular line. S1 and S2 were normal. She had no S3, S4, or  murmur. ABDOMEN: Soft. She had no palpable organs or masses.   EXTREMITIES: Revealed trace to +1 pedal edema bilaterally. She had  good pulses all around. NEUROLOGIC: At the time I saw her revealed her to be awake and  alert. She was completely confused, not oriented to time, place, or  person. Cranial nerves 1-12 within normal limits. Sensory, motor  examination were normal. DTRs were hyporeflexic but bilaterally  equal. Plantar responses were down. IMPRESSION  1. Change in mental status. 2. Dementia. 3. Rule out hypothyroidism. 4. Hypothermia by history, doubtful. PLAN: The patient to be admitted to observation status. Routine lab  database to be obtained. Her home medications have been resumed. Neurology consult will be left to the rounding physicians in the a.m. Usual housekeeping procedures to include a PPI for stress ulcer  prophylaxis and heparin compound for DVT prophylaxis.         MD Alpa Coronado / Trinh.Shannan  D:  04/21/2017   01:05  T:  04/21/2017   01:28  Job #:  [id number]

## 2017-04-21 NOTE — ROUTINE PROCESS
TRANSFER - OUT REPORT:    Verbal report given to Laltia Garcia RN on Neftali Berger  being transferred to [facility] CVT Stepdown room [phone] for routine progression of care       Report consisted of patients Situation, Background, Assessment and   Recommendations(SBAR). Information from the following report(s) ED Summary, MAR, Recent Results and Cardiac Rhythm NSR was reviewed with the receiving nurse. Lines:   Peripheral IV 04/19/17 Left; Lower Arm (Active)   Site Assessment Clean, dry, & intact 4/19/2017  5:15 PM   Phlebitis Assessment 0 4/19/2017  5:15 PM   Infiltration Assessment 0 4/19/2017  5:15 PM   Dressing Status Clean, dry, & intact 4/19/2017  5:15 PM   Dressing Type Tape;Transparent 4/19/2017  5:15 PM   Hub Color/Line Status Pink;Patent; Flushed 4/19/2017  5:15 PM   Action Taken Blood drawn 4/19/2017  5:15 PM       Peripheral IV 04/19/17 Right Antecubital (Active)   Site Assessment Clean, dry, & intact 4/19/2017  4:50 PM   Phlebitis Assessment 0 4/19/2017  4:50 PM   Infiltration Assessment 0 4/19/2017  4:50 PM   Dressing Status Clean, dry, & intact 4/19/2017  4:50 PM   Dressing Type Tape;Transparent 4/19/2017  4:50 PM   Hub Color/Line Status Pink;Flushed;Patent 4/19/2017  4:50 PM   Action Taken Blood drawn 4/19/2017  4:50 PM        Opportunity for questions and clarification was provided.       Patient transported with:   Monitor  O2 @ 2 liters

## 2017-04-21 NOTE — ROUTINE PROCESS
Bedside and Verbal shift change report given to  Linda Barnes RN (oncoming nurse) by Nicole Ferrell RN (offgoing nurse). Report included the following information SBAR, Kardex, MAR and Recent Results.

## 2017-04-22 LAB
ANION GAP BLD CALC-SCNC: 7 MMOL/L (ref 3–18)
BUN SERPL-MCNC: 22 MG/DL (ref 7–18)
BUN/CREAT SERPL: 21 (ref 12–20)
CALCIUM SERPL-MCNC: 8.6 MG/DL (ref 8.5–10.1)
CHLORIDE SERPL-SCNC: 110 MMOL/L (ref 100–108)
CO2 SERPL-SCNC: 29 MMOL/L (ref 21–32)
CREAT SERPL-MCNC: 1.04 MG/DL (ref 0.6–1.3)
ERYTHROCYTE [DISTWIDTH] IN BLOOD BY AUTOMATED COUNT: 15.3 % (ref 11.6–14.5)
GLUCOSE SERPL-MCNC: 85 MG/DL (ref 74–99)
HCT VFR BLD AUTO: 30.3 % (ref 35–45)
HGB BLD-MCNC: 10.2 G/DL (ref 12–16)
MCH RBC QN AUTO: 31.5 PG (ref 24–34)
MCHC RBC AUTO-ENTMCNC: 33.7 G/DL (ref 31–37)
MCV RBC AUTO: 93.5 FL (ref 74–97)
PLATELET # BLD AUTO: 181 K/UL (ref 135–420)
PMV BLD AUTO: 10.9 FL (ref 9.2–11.8)
POTASSIUM SERPL-SCNC: 3.9 MMOL/L (ref 3.5–5.5)
RBC # BLD AUTO: 3.24 M/UL (ref 4.2–5.3)
SODIUM SERPL-SCNC: 146 MMOL/L (ref 136–145)
T4 FREE SERPL-MCNC: 1 NG/DL (ref 0.7–1.5)
WBC # BLD AUTO: 5.9 K/UL (ref 4.6–13.2)

## 2017-04-22 PROCEDURE — A6209 FOAM DRSG <=16 SQ IN W/O BDR: HCPCS

## 2017-04-22 PROCEDURE — 80048 BASIC METABOLIC PNL TOTAL CA: CPT | Performed by: INTERNAL MEDICINE

## 2017-04-22 PROCEDURE — 74011250636 HC RX REV CODE- 250/636: Performed by: INTERNAL MEDICINE

## 2017-04-22 PROCEDURE — 84439 ASSAY OF FREE THYROXINE: CPT | Performed by: INTERNAL MEDICINE

## 2017-04-22 PROCEDURE — 36415 COLL VENOUS BLD VENIPUNCTURE: CPT | Performed by: INTERNAL MEDICINE

## 2017-04-22 PROCEDURE — 77030020186 HC BOOT HL PROTCT SAGE -B

## 2017-04-22 PROCEDURE — 74011250637 HC RX REV CODE- 250/637: Performed by: INTERNAL MEDICINE

## 2017-04-22 PROCEDURE — 85027 COMPLETE CBC AUTOMATED: CPT | Performed by: INTERNAL MEDICINE

## 2017-04-22 PROCEDURE — 65660000000 HC RM CCU STEPDOWN

## 2017-04-22 RX ORDER — LEVOTHYROXINE SODIUM 100 UG/1
100 TABLET ORAL
Status: DISCONTINUED | OUTPATIENT
Start: 2017-04-23 | End: 2017-04-23

## 2017-04-22 RX ADMIN — LEVOTHYROXINE SODIUM 50 MCG: 50 TABLET ORAL at 13:27

## 2017-04-22 RX ADMIN — ASPIRIN 81 MG CHEWABLE TABLET 81 MG: 81 TABLET CHEWABLE at 13:27

## 2017-04-22 RX ADMIN — CYANOCOBALAMIN TAB 1000 MCG 1000 MCG: 1000 TAB at 13:27

## 2017-04-22 RX ADMIN — TIMOLOL MALEATE 1 DROP: 5 SOLUTION OPHTHALMIC at 13:39

## 2017-04-22 RX ADMIN — ZOLPIDEM TARTRATE 5 MG: 5 TABLET, COATED ORAL at 00:41

## 2017-04-22 RX ADMIN — MULTI VITAMIN/MINERAL SUPPLEMENT CONTAINING VITAMIN C, VITAMIN D, VITAMIN E, THIAMIN, RIBOFLAVIN, NIACIN, PYRIDOXINE, FOLIC ACID, COBALAMIN, BIOTIN, PANTOTHENIC ACID, SELENIUM AND ZINC 1 TABLET: 60; 2000; 35; 1.5; 1.7; 20; 10; 1; 6; 300; 10; 12.5; 7 TABLET, COATED ORAL at 13:27

## 2017-04-22 RX ADMIN — DONEPEZIL HYDROCHLORIDE 10 MG: 5 TABLET, FILM COATED ORAL at 21:21

## 2017-04-22 RX ADMIN — Medication 500 MG: at 13:27

## 2017-04-22 RX ADMIN — HEPARIN SODIUM 5000 UNITS: 5000 INJECTION, SOLUTION INTRAVENOUS; SUBCUTANEOUS at 13:39

## 2017-04-22 RX ADMIN — HEPARIN SODIUM 5000 UNITS: 5000 INJECTION, SOLUTION INTRAVENOUS; SUBCUTANEOUS at 17:17

## 2017-04-22 NOTE — PROGRESS NOTES
[facility]ist Group  Progress Note    Patient: Negra Snell Age: 80 y.o. : 1924 MR#: [id number] SSN: [id number]  Date/Time: 2017     Subjective:     Confused   Disoriented     Assessment/Plan:   1. Hypothermia   2 hypothyroid   3 Acute metabolic encephalopathy   4 Dementia   5 dehydration     PLAN   - Increase IV synthroid to 100 mcg iv  - Check daily T4   - monitor her mental status - base line Mental status is very poor   - blood cultures were obtained as initially thought to be sepsis   - not on antibiotics   - IV hydration  - D/w Family at bedside         Case discussed with:  []Patient  [x]Family  []Nursing  []Case Management  DVT Prophylaxis:  []Lovenox  [x]Hep SQ  []SCDs  []Coumadin   []On Heparin gtt    Patient Active Problem List   Diagnosis Code    CVA (cerebral vascular accident) ([facility] [address] I63.9    Hypothermia T68. XXXA    Aspiration pneumonia ([facility] [address] J69.0    Encephalopathy acute G93.40       Objective:   VS:   Visit Vitals    /79    Pulse 83    Temp 97.3 °F (36.3 °C)    Resp 18    Ht 5' 3\" (1.6 m)    Wt 74.2 kg (163 lb 8 oz)    SpO2 94%    BMI 28.96 kg/m2      Tmax/24hrs: Temp (24hrs), Av.3 °F (36.3 °C), Min:97.2 °F (36.2 °C), Max:97.4 °F (36.3 °C)  IOBRIEF  Intake/Output Summary (Last 24 hours) at 17 1421  Last data filed at 17 0400   Gross per 24 hour   Intake              240 ml   Output                0 ml   Net              240 ml       General:  Confuse and disoriented    HEENT:  NC, Atraumatic. PERRLA, anicteric sclerae. Pulmonary:  CTA Bilaterally. No Wheezing/Rhonchi/Rales. Cardiovascular: Regular rate and Rhythm. GI:  Soft, Non distended, Non tender. + Bowel sounds. Extremities:  No edema, cyanosis, clubbing. No calf tenderness. Psych:  not examined . Neurologic: Grossly - Motor and Sensory functions are intact .  No Anxiety , calm , no Agitation         Medications:   Current Facility-Administered Medications   Medication Dose Route Frequency    [START ON 4/23/2017] levothyroxine (SYNTHROID) tablet 100 mcg  100 mcg Oral ACB    0.9% sodium chloride infusion  75 mL/hr IntraVENous CONTINUOUS    acetaminophen (TYLENOL) tablet 650 mg  650 mg Oral Q4H PRN    HYDROcodone-acetaminophen (NORCO) 5-325 mg per tablet 1 Tab  1 Tab Oral Q4H PRN    ondansetron (ZOFRAN) injection 4 mg  4 mg IntraVENous Q4H PRN    heparin (porcine) injection 5,000 Units  5,000 Units SubCUTAneous Q8H    calcium carbonate (OS-CONNIE) tablet 500 mg [elemental]  500 mg Oral DAILY    vit D3-B complex-C-FA--Zn (VITAL D) tablet 1 Tab  1 Tab Oral DAILY    Eye Omega Advantage (om3-dha-epa-D3-lutein-zeazanth 550-250-2.5-0.5 mg-unit-mg-mg cap 4 Cap)  4 Cap Oral QID WITH MEALS    cyanocobalamin tablet 1,000 mcg  1,000 mcg Oral DAILY    LORazepam (ATIVAN) tablet 0.5 mg  0.5 mg Oral QHS PRN    timolol (TIMOPTIC) 0.5 % ophthalmic solution 1 Drop  1 Drop Both Eyes DAILY    aspirin chewable tablet 81 mg  81 mg Oral DAILY    donepezil (ARICEPT) tablet 10 mg  10 mg Oral QHS    zolpidem (AMBIEN) tablet 5 mg  5 mg Oral QHS PRN    LORazepam (ATIVAN) tablet 0.5 mg  0.5 mg Oral Q8H PRN    sodium chloride (NS) flush 5-10 mL  5-10 mL IntraVENous PRN       Labs:    Recent Results (from the past 24 hour(s))   METABOLIC PANEL, BASIC    Collection Time: 04/22/17  5:49 AM   Result Value Ref Range    Sodium 146 (H) 136 - 145 mmol/L    Potassium 3.9 3.5 - 5.5 mmol/L    Chloride 110 (H) 100 - 108 mmol/L    CO2 29 21 - 32 mmol/L    Anion gap 7 3.0 - 18 mmol/L    Glucose 85 74 - 99 mg/dL    BUN 22 (H) 7.0 - 18 MG/DL    Creatinine 1.04 0.6 - 1.3 MG/DL    BUN/Creatinine ratio 21 (H) 12 - 20      GFR est AA 60 (L) >60 ml/min/1.73m2    GFR est non-AA 49 (L) >60 ml/min/1.73m2    Calcium 8.6 8.5 - 10.1 MG/DL   T4, FREE    Collection Time: 04/22/17  5:49 AM   Result Value Ref Range    T4, Free 1.0 0.7 - 1.5 NG/DL   CBC W/O DIFF    Collection Time: 04/22/17  5:49 AM Result Value Ref Range    WBC 5.9 4.6 - 13.2 K/uL    RBC 3.24 (L) 4.20 - 5.30 M/uL    HGB 10.2 (L) 12.0 - 16.0 g/dL    HCT 30.3 (L) 35.0 - 45.0 %    MCV 93.5 74.0 - 97.0 FL    MCH 31.5 24.0 - 34.0 PG    MCHC 33.7 31.0 - 37.0 g/dL    RDW 15.3 (H) 11.6 - 14.5 %    PLATELET 800 716 - 476 K/uL    MPV 10.9 9.2 - 11.8 FL       Signed By: Su Gary MD     April 22, 2017

## 2017-04-22 NOTE — ROUTINE PROCESS
TRANSFER - OUT REPORT:    Verbal report given to Rick Katz RN (name) on Leticia Swenson  being transferred to [facility](unit) for routine progression of care       Report consisted of patients Situation, Background, Assessment and   Recommendations(SBAR). Information from the following report(s) SBAR, Kardex, ED Summary, Procedure Summary, Intake/Output, MAR, Recent Results, Med Rec Status and Cardiac Rhythm SB, SR was reviewed with the receiving nurse. Lines:   Peripheral IV 04/19/17 Left; Lower Arm (Active)   Site Assessment Clean, dry, & intact 4/22/2017  4:00 PM   Phlebitis Assessment 0 4/22/2017  4:00 PM   Infiltration Assessment 0 4/22/2017  4:00 PM   Dressing Status Clean, dry, & intact 4/22/2017  4:00 PM   Dressing Type Transparent;Tape 4/22/2017  4:00 PM   Hub Color/Line Status Pink;Flushed;Patent 4/22/2017  4:00 PM   Action Taken Blood drawn 4/19/2017  5:15 PM        Opportunity for questions and clarification was provided.       Patient transported with:   Patient-specific medications from [facility]

## 2017-04-22 NOTE — ROUTINE PROCESS
Bedside and Verbal shift change report given to Maliha Da Silva RN (oncoming nurse) by Antonio Mahoney RN (offgoing nurse). Report included the following information SBAR, Kardex, MAR and Recent Results.

## 2017-04-22 NOTE — PROGRESS NOTES
0730 Received report from off going RN. Patient alert and oriented to person. Patient pleasantly confused. . Normal saline infusing at 75mlhr. Family at bedside . Instructed to call for any needs. 1300 Incontinence care done. Patient repositioned in bed for comfort. Daughter remains at bedside. 1700 Resting in bed with family present. Denies needs. 1930 Bedside and Verbal shift change report given to [id number] Claudia Hunter (oncoming nurse) by Colin Woodard (offgoing nurse). Report included the following information SBAR, Kardex and MAR.

## 2017-04-23 LAB
ANION GAP BLD CALC-SCNC: 6 MMOL/L (ref 3–18)
BUN SERPL-MCNC: 20 MG/DL (ref 7–18)
BUN/CREAT SERPL: 24 (ref 12–20)
CALCIUM SERPL-MCNC: 8.6 MG/DL (ref 8.5–10.1)
CHLORIDE SERPL-SCNC: 108 MMOL/L (ref 100–108)
CO2 SERPL-SCNC: 28 MMOL/L (ref 21–32)
CREAT SERPL-MCNC: 0.83 MG/DL (ref 0.6–1.3)
GLUCOSE SERPL-MCNC: 72 MG/DL (ref 74–99)
POTASSIUM SERPL-SCNC: 4 MMOL/L (ref 3.5–5.5)
SODIUM SERPL-SCNC: 142 MMOL/L (ref 136–145)
T4 FREE SERPL-MCNC: 1 NG/DL (ref 0.7–1.5)

## 2017-04-23 PROCEDURE — 84439 ASSAY OF FREE THYROXINE: CPT | Performed by: INTERNAL MEDICINE

## 2017-04-23 PROCEDURE — 74011250636 HC RX REV CODE- 250/636: Performed by: INTERNAL MEDICINE

## 2017-04-23 PROCEDURE — 36415 COLL VENOUS BLD VENIPUNCTURE: CPT | Performed by: INTERNAL MEDICINE

## 2017-04-23 PROCEDURE — 74011000250 HC RX REV CODE- 250: Performed by: INTERNAL MEDICINE

## 2017-04-23 PROCEDURE — 74011250637 HC RX REV CODE- 250/637: Performed by: INTERNAL MEDICINE

## 2017-04-23 PROCEDURE — 80048 BASIC METABOLIC PNL TOTAL CA: CPT | Performed by: INTERNAL MEDICINE

## 2017-04-23 PROCEDURE — 65660000000 HC RM CCU STEPDOWN

## 2017-04-23 RX ORDER — LEVOTHYROXINE SODIUM 100 UG/1
100 TABLET ORAL
Status: DISCONTINUED | OUTPATIENT
Start: 2017-04-24 | End: 2017-04-26 | Stop reason: HOSPADM

## 2017-04-23 RX ADMIN — CYANOCOBALAMIN TAB 1000 MCG 1000 MCG: 1000 TAB at 08:28

## 2017-04-23 RX ADMIN — Medication 4 CAPSULE: at 22:16

## 2017-04-23 RX ADMIN — HEPARIN SODIUM 5000 UNITS: 5000 INJECTION, SOLUTION INTRAVENOUS; SUBCUTANEOUS at 18:46

## 2017-04-23 RX ADMIN — HEPARIN SODIUM 5000 UNITS: 5000 INJECTION, SOLUTION INTRAVENOUS; SUBCUTANEOUS at 11:16

## 2017-04-23 RX ADMIN — DONEPEZIL HYDROCHLORIDE 10 MG: 5 TABLET, FILM COATED ORAL at 22:11

## 2017-04-23 RX ADMIN — SODIUM CHLORIDE 75 ML/HR: 900 INJECTION, SOLUTION INTRAVENOUS at 05:22

## 2017-04-23 RX ADMIN — LEVOTHYROXINE SODIUM 100 MCG: 100 TABLET ORAL at 08:28

## 2017-04-23 RX ADMIN — ASPIRIN 81 MG CHEWABLE TABLET 81 MG: 81 TABLET CHEWABLE at 08:28

## 2017-04-23 RX ADMIN — HEPARIN SODIUM 5000 UNITS: 5000 INJECTION, SOLUTION INTRAVENOUS; SUBCUTANEOUS at 01:44

## 2017-04-23 RX ADMIN — MULTI VITAMIN/MINERAL SUPPLEMENT CONTAINING VITAMIN C, VITAMIN D, VITAMIN E, THIAMIN, RIBOFLAVIN, NIACIN, PYRIDOXINE, FOLIC ACID, COBALAMIN, BIOTIN, PANTOTHENIC ACID, SELENIUM AND ZINC 1 TABLET: 60; 2000; 35; 1.5; 1.7; 20; 10; 1; 6; 300; 10; 12.5; 7 TABLET, COATED ORAL at 08:28

## 2017-04-23 RX ADMIN — Medication 500 MG: at 08:28

## 2017-04-23 RX ADMIN — TIMOLOL MALEATE 1 DROP: 5 SOLUTION OPHTHALMIC at 11:07

## 2017-04-23 RX ADMIN — LORAZEPAM 0.5 MG: 0.5 TABLET ORAL at 02:18

## 2017-04-23 NOTE — ROUTINE PROCESS
Received report from [address] [place] [address]. [facility] AAOx1-self, NAD, breathing non labored, on room air, HOB up. Family member at the bedside. Bed at the lowest level on lock position, call bell w/i reach. Bedside and Verbal shift change report given to RUSTY Bowen (oncoming nurse) by me (offgoing nurse).  Report included the following information SBAR, Kardex, Intake/Output, MAR, Recent Results and Cardiac Rhythm SR.

## 2017-04-23 NOTE — PROGRESS NOTES
[facility] Hospitalist Group  Progress Note    Patient: Myranda Mcclure Age: 80 y.o. : 1924 MR#: [id number] SSN: [id number]  Date/Time: 2017     Subjective:     Confused   Disoriented     Assessment/Plan:   1. Hypothermia   2 hypothyroid   3 Acute metabolic encephalopathy   4 Dementia   5 dehydration     PLAN   - Continue synthroid replacement IV - monitor T4   - still less communicative , this morning long effect of Ativan - sleeping   - Family at bedside - D/W them regarding placement , they want long term   If patient's mental condition does not improve then po meds will be difficult . - consult placed to Case management       Case discussed with:  []Patient  [x]Family  []Nursing  []Case Management  DVT Prophylaxis:  []Lovenox  [x]Hep SQ  []SCDs  []Coumadin   []On Heparin gtt    Patient Active Problem List   Diagnosis Code    CVA (cerebral vascular accident) ([facility] [address] I63.9    Hypothermia T68. XXXA    Aspiration pneumonia ([facility] [address] J69.0    Encephalopathy acute G93.40       Objective:   VS:   Visit Vitals    BP 96/52 (BP 1 Location: Right arm, BP Patient Position: At rest)    Pulse (!) 57    Temp 97.3 °F (36.3 °C)    Resp 18    Ht 5' 3\" (1.6 m)    Wt 71.4 kg (157 lb 4.8 oz)    SpO2 98%    BMI 27.86 kg/m2      Tmax/24hrs: Temp (24hrs), Av.4 °F (36.3 °C), Min:97.2 °F (36.2 °C), Max:97.8 °F (36.6 °C)  IOBRIEF    Intake/Output Summary (Last 24 hours) at 17 1417  Last data filed at 17 1800   Gross per 24 hour   Intake              450 ml   Output                0 ml   Net              450 ml       General:  Confuse and disoriented    HEENT:  NC, Atraumatic. PERRLA, anicteric sclerae. Pulmonary:  CTA Bilaterally. No Wheezing/Rhonchi/Rales. Cardiovascular: Regular rate and Rhythm. GI:  Soft, Non distended, Non tender. + Bowel sounds. Extremities:  No edema, cyanosis, clubbing. No calf tenderness. Psych:  not examined .   Neurologic: Grossly - Motor and Sensory functions are intact .  No Anxiety , calm , no Agitation         Medications:   Current Facility-Administered Medications   Medication Dose Route Frequency    levothyroxine (SYNTHROID) tablet 100 mcg  100 mcg Oral ACB    0.9% sodium chloride infusion  75 mL/hr IntraVENous CONTINUOUS    acetaminophen (TYLENOL) tablet 650 mg  650 mg Oral Q4H PRN    HYDROcodone-acetaminophen (NORCO) 5-325 mg per tablet 1 Tab  1 Tab Oral Q4H PRN    ondansetron (ZOFRAN) injection 4 mg  4 mg IntraVENous Q4H PRN    heparin (porcine) injection 5,000 Units  5,000 Units SubCUTAneous Q8H    calcium carbonate (OS-CONNIE) tablet 500 mg [elemental]  500 mg Oral DAILY    vit D3-B complex-C-FA--Zn (VITAL D) tablet 1 Tab  1 Tab Oral DAILY    Eye Omega Advantage (om3-dha-epa-D3-lutein-zeazanth 550-250-2.5-0.5 mg-unit-mg-mg cap 4 Cap)  4 Cap Oral QID WITH MEALS    cyanocobalamin tablet 1,000 mcg  1,000 mcg Oral DAILY    LORazepam (ATIVAN) tablet 0.5 mg  0.5 mg Oral QHS PRN    timolol (TIMOPTIC) 0.5 % ophthalmic solution 1 Drop  1 Drop Both Eyes DAILY    aspirin chewable tablet 81 mg  81 mg Oral DAILY    donepezil (ARICEPT) tablet 10 mg  10 mg Oral QHS    zolpidem (AMBIEN) tablet 5 mg  5 mg Oral QHS PRN    LORazepam (ATIVAN) tablet 0.5 mg  0.5 mg Oral Q8H PRN    sodium chloride (NS) flush 5-10 mL  5-10 mL IntraVENous PRN       Labs:    Recent Results (from the past 24 hour(s))   T4, FREE    Collection Time: 04/23/17  3:11 AM   Result Value Ref Range    T4, Free 1.0 0.7 - 1.5 NG/DL   METABOLIC PANEL, BASIC    Collection Time: 04/23/17  3:11 AM   Result Value Ref Range    Sodium 142 136 - 145 mmol/L    Potassium 4.0 3.5 - 5.5 mmol/L    Chloride 108 100 - 108 mmol/L    CO2 28 21 - 32 mmol/L    Anion gap 6 3.0 - 18 mmol/L    Glucose 72 (L) 74 - 99 mg/dL    BUN 20 (H) 7.0 - 18 MG/DL    Creatinine 0.83 0.6 - 1.3 MG/DL    BUN/Creatinine ratio 24 (H) 12 - 20      GFR est AA >60 >60 ml/min/1.73m2    GFR est non-AA >60 >60 ml/min/1.73m2 Calcium 8.6 8.5 - 10.1 MG/DL       Signed By: Matthew Grover MD     April 23, 2017

## 2017-04-23 NOTE — ROUTINE PROCESS
Received report from [facility]. Pt AAOx3, NAD, breathing non labored, on room air, HOB up. IVF going per order. Family member at the bedside. Bed at the lowest level on lock position, call bell w/i reach. Bedside and Verbal shift change report given to RUSTY Martinez (oncoming nurse) by me (offgoing nurse).  Report included the following information SBAR, Kardex, Intake/Output, MAR, Recent Results and Cardiac Rhythm SR.

## 2017-04-24 LAB
25(OH)D2 SERPL-MCNC: <1 NG/ML
25(OH)D3 SERPL-MCNC: 42 NG/ML
25(OH)D3+25(OH)D2 SERPL-MCNC: 42 NG/ML
THYROGLOB AB SERPL-ACNC: 28.4 IU/ML (ref 0–0.9)
THYROPEROXIDASE AB SERPL-ACNC: 143 IU/ML (ref 0–34)

## 2017-04-24 PROCEDURE — 65660000000 HC RM CCU STEPDOWN

## 2017-04-24 PROCEDURE — 74011250637 HC RX REV CODE- 250/637: Performed by: INTERNAL MEDICINE

## 2017-04-24 PROCEDURE — 74011250636 HC RX REV CODE- 250/636: Performed by: INTERNAL MEDICINE

## 2017-04-24 PROCEDURE — 74011000250 HC RX REV CODE- 250: Performed by: INTERNAL MEDICINE

## 2017-04-24 RX ADMIN — CYANOCOBALAMIN TAB 1000 MCG 1000 MCG: 1000 TAB at 08:59

## 2017-04-24 RX ADMIN — Medication 4 CAPSULE: at 12:38

## 2017-04-24 RX ADMIN — TIMOLOL MALEATE 1 DROP: 5 SOLUTION OPHTHALMIC at 09:00

## 2017-04-24 RX ADMIN — SODIUM CHLORIDE 75 ML/HR: 900 INJECTION, SOLUTION INTRAVENOUS at 07:02

## 2017-04-24 RX ADMIN — HEPARIN SODIUM 5000 UNITS: 5000 INJECTION, SOLUTION INTRAVENOUS; SUBCUTANEOUS at 18:41

## 2017-04-24 RX ADMIN — HEPARIN SODIUM 5000 UNITS: 5000 INJECTION, SOLUTION INTRAVENOUS; SUBCUTANEOUS at 12:37

## 2017-04-24 RX ADMIN — Medication 500 MG: at 08:59

## 2017-04-24 RX ADMIN — MULTI VITAMIN/MINERAL SUPPLEMENT CONTAINING VITAMIN C, VITAMIN D, VITAMIN E, THIAMIN, RIBOFLAVIN, NIACIN, PYRIDOXINE, FOLIC ACID, COBALAMIN, BIOTIN, PANTOTHENIC ACID, SELENIUM AND ZINC 1 TABLET: 60; 2000; 35; 1.5; 1.7; 20; 10; 1; 6; 300; 10; 12.5; 7 TABLET, COATED ORAL at 08:59

## 2017-04-24 RX ADMIN — Medication 4 CAPSULE: at 18:09

## 2017-04-24 RX ADMIN — HEPARIN SODIUM 5000 UNITS: 5000 INJECTION, SOLUTION INTRAVENOUS; SUBCUTANEOUS at 02:42

## 2017-04-24 RX ADMIN — ASPIRIN 81 MG CHEWABLE TABLET 81 MG: 81 TABLET CHEWABLE at 08:59

## 2017-04-24 RX ADMIN — LEVOTHYROXINE SODIUM 100 MCG: 100 TABLET ORAL at 07:14

## 2017-04-24 RX ADMIN — Medication 4 CAPSULE: at 08:57

## 2017-04-24 RX ADMIN — SODIUM CHLORIDE 75 ML/HR: 900 INJECTION, SOLUTION INTRAVENOUS at 18:41

## 2017-04-24 RX ADMIN — DONEPEZIL HYDROCHLORIDE 10 MG: 5 TABLET, FILM COATED ORAL at 21:32

## 2017-04-24 NOTE — PROGRESS NOTES
[facility] Hospitalist Group  Progress Note    Patient: Kristel Overton Age: 80 y.o. : 1924 MR#: [id number] SSN: [id number]  Date/Time: 2017 11:45 AM    Subjective/24-hour events:     Sleeping currently. Daughter present at bedside. Assessment:   Acute metabolic encephalopathy  Hypothermia  Hypothyroidism  Dementia  Advanced age    Plan:  Thyroid replacement as ordered. PT/OT. Supportive care o/w. SNF disposition at discharge. Case discussed with:  []Patient  [x]Family  []Nursing  []Case Management  DVT Prophylaxis:  []Lovenox  [x]Hep SQ  []SCDs  []Coumadin   []On Heparin gtt    Objective:   VS:   Visit Vitals    /76 (BP 1 Location: Left arm, BP Patient Position: At rest)    Pulse (!) 54    Temp 98.1 °F (36.7 °C)    Resp 18    Ht 5' 3\" (1.6 m)    Wt 71.4 kg (157 lb 4.8 oz)    SpO2 97%    BMI 27.86 kg/m2      Tmax/24hrs: Temp (24hrs), Av.8 °F (36.6 °C), Min:97.3 °F (36.3 °C), Max:98.4 °F (36.9 °C)    Intake/Output Summary (Last 24 hours) at 17 1145  Last data filed at 17 0659   Gross per 24 hour   Intake             1901 ml   Output                1 ml   Net             1900 ml       General:  In NAD. Cardiovascular: RRR. Pulmonary:  No wheezes, effort nonlabored. GI:  Abdomen soft, NTTP. Extremities:  Warm, no ischemia. Neuro:  Sleeping. Labs:    No results found for this or any previous visit (from the past 24 hour(s)).     Signed By: Maxine Milian MD     2017 11:45 AM

## 2017-04-24 NOTE — PROGRESS NOTES
Called, spoke to pt's daughter/POMarta Ruth-[phone] in regards to upcoming pt's discharge. I asked Mrs. Ruth the facility of choice for skilled care for her mother, she chose [facility].

## 2017-04-24 NOTE — ROUTINE PROCESS
Bedside and Verbal shift change report given to Ruy Castro RN (oncoming nurse) by Alaina Rodriguez RN (offgoing nurse). Report given with Patricia MARSHALL and MAR.

## 2017-04-25 LAB
BACTERIA SPEC CULT: NORMAL
BACTERIA SPEC CULT: NORMAL
SERVICE CMNT-IMP: NORMAL
SERVICE CMNT-IMP: NORMAL

## 2017-04-25 PROCEDURE — 74011250637 HC RX REV CODE- 250/637: Performed by: INTERNAL MEDICINE

## 2017-04-25 PROCEDURE — 74011250636 HC RX REV CODE- 250/636: Performed by: INTERNAL MEDICINE

## 2017-04-25 PROCEDURE — 65660000000 HC RM CCU STEPDOWN

## 2017-04-25 PROCEDURE — 74011000250 HC RX REV CODE- 250: Performed by: INTERNAL MEDICINE

## 2017-04-25 RX ADMIN — ASPIRIN 81 MG CHEWABLE TABLET 81 MG: 81 TABLET CHEWABLE at 10:32

## 2017-04-25 RX ADMIN — Medication 4 CAPSULE: at 17:44

## 2017-04-25 RX ADMIN — HEPARIN SODIUM 5000 UNITS: 5000 INJECTION, SOLUTION INTRAVENOUS; SUBCUTANEOUS at 17:43

## 2017-04-25 RX ADMIN — Medication 4 CAPSULE: at 12:40

## 2017-04-25 RX ADMIN — HEPARIN SODIUM 5000 UNITS: 5000 INJECTION, SOLUTION INTRAVENOUS; SUBCUTANEOUS at 10:31

## 2017-04-25 RX ADMIN — DONEPEZIL HYDROCHLORIDE 10 MG: 5 TABLET, FILM COATED ORAL at 22:43

## 2017-04-25 RX ADMIN — LEVOTHYROXINE SODIUM 100 MCG: 100 TABLET ORAL at 10:31

## 2017-04-25 RX ADMIN — CYANOCOBALAMIN TAB 1000 MCG 1000 MCG: 1000 TAB at 10:31

## 2017-04-25 RX ADMIN — TIMOLOL MALEATE 1 DROP: 5 SOLUTION OPHTHALMIC at 12:41

## 2017-04-25 RX ADMIN — SODIUM CHLORIDE 75 ML/HR: 900 INJECTION, SOLUTION INTRAVENOUS at 08:07

## 2017-04-25 RX ADMIN — MULTI VITAMIN/MINERAL SUPPLEMENT CONTAINING VITAMIN C, VITAMIN D, VITAMIN E, THIAMIN, RIBOFLAVIN, NIACIN, PYRIDOXINE, FOLIC ACID, COBALAMIN, BIOTIN, PANTOTHENIC ACID, SELENIUM AND ZINC 1 TABLET: 60; 2000; 35; 1.5; 1.7; 20; 10; 1; 6; 300; 10; 12.5; 7 TABLET, COATED ORAL at 10:31

## 2017-04-25 RX ADMIN — Medication 4 CAPSULE: at 22:49

## 2017-04-25 RX ADMIN — Medication 500 MG: at 10:31

## 2017-04-25 RX ADMIN — HEPARIN SODIUM 5000 UNITS: 5000 INJECTION, SOLUTION INTRAVENOUS; SUBCUTANEOUS at 02:21

## 2017-04-25 NOTE — ROUTINE PROCESS
Bedside and Verbal shift change report given to Odette Roldan (oncoming nurse) by Jaz Dominguez (offgoing nurse). Report included the following information SBAR, Kardex and Recent Results. Bedside and Verbal shift change report given to Da Rawls (oncoming nurse) by Odette Roldan (offgoing nurse). Report included the following information SBAR, Recent Results and Med Rec Status.

## 2017-04-25 NOTE — PROGRESS NOTES
0700- bedside shift report given by JEFERSON Osorio ( off going ) Patient sleeping and family at bedside. Kardex and plan of care reviewed.

## 2017-04-25 NOTE — ROUTINE PROCESS
Bedside and Verbal shift change report given to Felicity Acevedo RN (oncoming nurse) by Fritz Chatman RN (offgoing nurse). Report included the following information SBAR, Kardex and Recent Results.

## 2017-04-25 NOTE — PROGRESS NOTES
OT orders received and chart reviewed. Patient is currently on bedrest at this time. Please update patients' activity level prior to OT evaluation. Thank you.     Jenine Closs OTR/L

## 2017-04-25 NOTE — PROGRESS NOTES
[facility] Hospitalist Group  Progress Note    Patient: Reinaldo Martinez Age: 80 y.o. : 1924 MR#: [id number] SSN: [id number]  Date/Time: 2017 12:33 PM    Subjective/24-hour events:     Awake and being fed lunch. No chest pain or SOB. Daughter present at bedside. Assessment:   Acute metabolic encephalopathy  Hypothermia  Hypothyroidism  Dementia  Advanced age    Plan:  Disposition pending. Family looking for LT placement and patient was denied at [facility]. Information is out to alternative facilities. Will await notification on accepting facility. Medical management as ordered in interim. Case discussed with:  []Patient  [x]Family  []Nursing  []Case Management  DVT Prophylaxis:  []Lovenox  [x]Hep SQ  []SCDs  []Coumadin   []On Heparin gtt    Objective:   VS:   Visit Vitals    /68 (BP 1 Location: Left arm, BP Patient Position: At rest)    Pulse 65    Temp 98.3 °F (36.8 °C)    Resp 18    Ht 5' 3\" (1.6 m)    Wt 71.4 kg (157 lb 4.8 oz)    SpO2 93%    BMI 27.86 kg/m2      Tmax/24hrs: Temp (24hrs), Av °F (36.7 °C), Min:97.7 °F (36.5 °C), Max:98.3 °F (36.8 °C)      Intake/Output Summary (Last 24 hours) at 17 1234  Last data filed at 17 1900   Gross per 24 hour   Intake             1140 ml   Output                0 ml   Net             1140 ml       General:  In NAD. Cardiovascular: RRR. Pulmonary:  No wheezes, effort nonlabored. GI:  Abdomen soft, NTTP. Extremities:  Warm, no ischemia. Neuro: Moves extremities spontaneously. Labs:    No results found for this or any previous visit (from the past 24 hour(s)).     Signed By: Laney Irene MD     2017 12:33 PM

## 2017-04-26 VITALS
RESPIRATION RATE: 18 BRPM | SYSTOLIC BLOOD PRESSURE: 121 MMHG | HEART RATE: 54 BPM | HEIGHT: 63 IN | TEMPERATURE: 97.4 F | WEIGHT: 157.3 LBS | DIASTOLIC BLOOD PRESSURE: 76 MMHG | BODY MASS INDEX: 27.87 KG/M2 | OXYGEN SATURATION: 95 %

## 2017-04-26 LAB
ERYTHROCYTE [DISTWIDTH] IN BLOOD BY AUTOMATED COUNT: 15.5 % (ref 11.6–14.5)
HCT VFR BLD AUTO: 29.1 % (ref 35–45)
HGB BLD-MCNC: 9.8 G/DL (ref 12–16)
MCH RBC QN AUTO: 31.8 PG (ref 24–34)
MCHC RBC AUTO-ENTMCNC: 33.7 G/DL (ref 31–37)
MCV RBC AUTO: 94.5 FL (ref 74–97)
PLATELET # BLD AUTO: 190 K/UL (ref 135–420)
PMV BLD AUTO: 11.2 FL (ref 9.2–11.8)
RBC # BLD AUTO: 3.08 M/UL (ref 4.2–5.3)
WBC # BLD AUTO: 5.3 K/UL (ref 4.6–13.2)

## 2017-04-26 PROCEDURE — 97530 THERAPEUTIC ACTIVITIES: CPT

## 2017-04-26 PROCEDURE — 97535 SELF CARE MNGMENT TRAINING: CPT

## 2017-04-26 PROCEDURE — 97162 PT EVAL MOD COMPLEX 30 MIN: CPT

## 2017-04-26 PROCEDURE — 74011250637 HC RX REV CODE- 250/637: Performed by: INTERNAL MEDICINE

## 2017-04-26 PROCEDURE — 74011000250 HC RX REV CODE- 250: Performed by: INTERNAL MEDICINE

## 2017-04-26 PROCEDURE — 36415 COLL VENOUS BLD VENIPUNCTURE: CPT | Performed by: INTERNAL MEDICINE

## 2017-04-26 PROCEDURE — 74011250636 HC RX REV CODE- 250/636: Performed by: INTERNAL MEDICINE

## 2017-04-26 PROCEDURE — 97166 OT EVAL MOD COMPLEX 45 MIN: CPT

## 2017-04-26 PROCEDURE — 85027 COMPLETE CBC AUTOMATED: CPT | Performed by: INTERNAL MEDICINE

## 2017-04-26 RX ORDER — LORAZEPAM 0.5 MG/1
0.5 TABLET ORAL
Qty: 10 TAB | Refills: 0 | Status: SHIPPED | OUTPATIENT
Start: 2017-04-26

## 2017-04-26 RX ORDER — LEVOTHYROXINE SODIUM 100 UG/1
100 TABLET ORAL
Qty: 30 TAB | Refills: 0 | Status: SHIPPED
Start: 2017-04-26

## 2017-04-26 RX ORDER — HYDROCODONE BITARTRATE AND ACETAMINOPHEN 5; 325 MG/1; MG/1
1 TABLET ORAL
Qty: 10 TAB | Refills: 0 | Status: SHIPPED | OUTPATIENT
Start: 2017-04-26

## 2017-04-26 RX ADMIN — TIMOLOL MALEATE 1 DROP: 5 SOLUTION OPHTHALMIC at 10:01

## 2017-04-26 RX ADMIN — Medication 4 CAPSULE: at 09:59

## 2017-04-26 RX ADMIN — Medication 4 CAPSULE: at 12:41

## 2017-04-26 RX ADMIN — Medication 500 MG: at 09:57

## 2017-04-26 RX ADMIN — MULTI VITAMIN/MINERAL SUPPLEMENT CONTAINING VITAMIN C, VITAMIN D, VITAMIN E, THIAMIN, RIBOFLAVIN, NIACIN, PYRIDOXINE, FOLIC ACID, COBALAMIN, BIOTIN, PANTOTHENIC ACID, SELENIUM AND ZINC 1 TABLET: 60; 2000; 35; 1.5; 1.7; 20; 10; 1; 6; 300; 10; 12.5; 7 TABLET, COATED ORAL at 09:57

## 2017-04-26 RX ADMIN — HEPARIN SODIUM 5000 UNITS: 5000 INJECTION, SOLUTION INTRAVENOUS; SUBCUTANEOUS at 09:57

## 2017-04-26 RX ADMIN — HEPARIN SODIUM 5000 UNITS: 5000 INJECTION, SOLUTION INTRAVENOUS; SUBCUTANEOUS at 02:00

## 2017-04-26 RX ADMIN — SODIUM CHLORIDE 75 ML/HR: 900 INJECTION, SOLUTION INTRAVENOUS at 10:07

## 2017-04-26 RX ADMIN — LEVOTHYROXINE SODIUM 100 MCG: 100 TABLET ORAL at 09:57

## 2017-04-26 RX ADMIN — ASPIRIN 81 MG CHEWABLE TABLET 81 MG: 81 TABLET CHEWABLE at 09:57

## 2017-04-26 RX ADMIN — CYANOCOBALAMIN TAB 1000 MCG 1000 MCG: 1000 TAB at 09:57

## 2017-04-26 NOTE — PROGRESS NOTES
Problem: Mobility Impaired (Adult and Pediatric)  Goal: *Acute Goals and Plan of Care (Insert Text)  Physical Therapy Goals  Initiated 4/26/2017 and to be accomplished within 7 day(s): 5/3/2017  1. Patient will move from supine to sit and sit to supine in bed with modified independence. 2. Patient will transfer from bed to chair and chair to bed with supervision/set-up using the least restrictive device. 3. Patient will perform sit to stand with supervision/set-up. 4. Patient will ambulate with supervision/set-up for 50 feet with the least restrictive device. 5. Patient will ascend/descend 4 stairs with bilateral handrail(s) with minimal assistance/contact guard assist.  PHYSICAL THERAPY EVALUATION     Patient: Neftali Berger (39 y.o. female)  Date: 4/26/2017  Primary Diagnosis: Hypothermia  Aspiration pneumonia (HCC)  Hypothermia  Encephalopathy acute        Precautions:  Fall      ASSESSMENT :  Pt is 79 yo female with past hx of dementia seen today for functional mobility evaluation. Pt's daughter (CG) was present for evaluation and stated that pt was independent with ambulation, required supervision for steps, and assistance with sit<>stand PTA however pt's functional mobility has steadily declined. Based on the objective data described below, the patient presents with LE weakness, decreased LE ROM R>L, impaired balance and decreased activity tolerance resulting in impaired functional mobility and decreased performance of ADLs/IADLs. Patient will benefit from skilled intervention to address the above impairments.   Patients rehabilitation potential is considered to be Fair  Factors which may influence rehabilitation potential include:   [ ]         None noted  [X]         Mental ability/status  [ ]         Medical condition  [ ]         Home/family situation and support systems  [ ]         Safety awareness  [ ]         Pain tolerance/management  [ ]         Other:        PLAN :  Recommendations and Planned Interventions:  [X]           Bed Mobility Training             [X]    Neuromuscular Re-Education  [X]           Transfer Training                   [ ]    Orthotic/Prosthetic Training  [X]           Gait Training                          [ ]    Modalities  [X]           Therapeutic Exercises          [ ]    Edema Management/Control  [X]           Therapeutic Activities            [X]    Patient and Family Training/Education  [ ]           Other (comment):     Frequency/Duration: Patient will be followed by physical therapy 1-2 times per day/4-7 days per week to address goals. Discharge Recommendations: Joaquín Quesada  Further Equipment Recommendations for Discharge: TBD at next level of care       G-CODES      Mobility  Current  CL= 60-79%   Goal  CI= 1-19%. The severity rating is based on the Level of Assistance required for Functional Mobility and ADLs.            G-CODES      Eval Complexity: History: MEDIUM  Complexity : 1-2 comorbidities / personal factors will impact the outcome/ POC Exam:MEDIUM Complexity : 3 Standardized tests and measures addressing body structure, function, activity limitation and / or participation in recreation  Presentation: MEDIUM Complexity : Evolving with changing characteristics  Clinical Decision Making:Medium Complexity  Overall Complexity:MEDIUM      SUBJECTIVE:   Patient stated I feel good.       OBJECTIVE DATA SUMMARY:       Past Medical History:   Diagnosis Date    Neurological disorder       dementia    Pneumonia       Past Surgical History:   Procedure Laterality Date    HX APPENDECTOMY         Prior Level of Function/Home Situation: Pt lives with her daughter. Pt  Mod I for ambulation, Supervision for steps, requires assist for sit<>stand.   Home Situation  Home Environment: Private residence  # Steps to Enter: 4  Rails to Enter: Yes  Hand Rails : Bilateral  Wheelchair Ramp: No  One/Two Story Residence: One story  Living Alone: No  Support Systems: Family member(s)  Patient Expects to be Discharged to[de-identified] Private residence  Current DME Used/Available at Home: Vicie Poet, rollator, Wheelchair, [address] chair, Commode, bedside, Walker, rolling, Grab bars  Critical Behavior:  Neurologic State: Alert  Orientation Level: Oriented to person;Oriented to place  Cognition: Follows commands     Psychosocial  Patient Behaviors: Calm; Cooperative  Family  Behaviors: Calm;Supportive  Needs Expressed: Nutritional  Purposeful Interaction: Yes     Strength:    Strength: Generally decreased, functional     Range Of Motion:  AROM: Generally decreased, functional (unable to flex R knee to 45 deg)  PROM: Generally decreased, functional (Unable to flex R knee 45 deg)     Functional Mobility:  Bed Mobility:  Supine to Sit: Minimum assistance  Scooting: Minimum assistance (extra time)  Transfers:  Sit to Stand: Moderate assistance;Assist x2  Stand to Sit: Moderate assistance;Assist x2  Balance:   Sitting: Intact; With support (with supervision)  Standing: Impaired; With support  Standing - Static: Poor  Standing - Dynamic : Poor  Therapeutic Exercises:   Sit<>Stand x2 for toileting care  Bed Mobility   Pain:  Pt reports 0/10 pain or discomfort prior to treatment. Pt reports 0/10 pain or discomfort post treatment. Activity Tolerance:   Fair - fatigues quickly  Please refer to the flowsheet for vital signs taken during this treatment. After treatment:   [ ]         Patient left in no apparent distress sitting up in chair  [X]         Patient left in no apparent distress in bed, pt with OT   [X]         Call bell left within reach  [ ]         Nursing notified  [ ]         Caregiver present  [ ]         Bed alarm activated      COMMUNICATION/EDUCATION:   [X]         Fall prevention education was provided and the patient/caregiver indicated understanding. [X]         Patient/family have participated as able in goal setting and plan of care.   [ ] Patient/family agree to work toward stated goals and plan of care. [ ]         Patient understands intent and goals of therapy, but is neutral about his/her participation. [ ]         Patient is unable to participate in goal setting and plan of care.      Thank you for this referral.  Jolynn Albert, PT, DPT   Time Calculation: 43 mins

## 2017-04-26 NOTE — DISCHARGE SUMMARY
Discharge Summary    Patient: Myranda Mcclure MRN: [id number]  CSN: [id number]    YOB: 1924  Age: 80 y.o. Sex: female    DOA: 4/19/2017 LOS:  LOS: 6 days   Discharge Date: 4/26/2017     Admission Diagnoses:  Acute metabolic encephalopathy  Hypothermia    Discharge Diagnoses:    Acute metabolic encephalopathy, resolved  Hypothermia   Multiple mechanical falls  Hypothyroidism  Dementia  Advanced age      Discharge Condition: Stable    PHYSICAL EXAM  Visit Vitals    /68 (BP 1 Location: Right arm, BP Patient Position: At rest)    Pulse (!) 56    Temp 97.5 °F (36.4 °C)    Resp 18    Ht 5' 3\" (1.6 m)    Wt 71.4 kg (157 lb 4.8 oz)    SpO2 95%    BMI 27.86 kg/m2       General: In NAD. Nontoxic-appearing.    HEENT: NCAT. Lungs:  Clear, no R/W/R. Effort nonlabored. Heart:  RRR. Abdomen: Soft, NTTP. Extremities: Warm, no ischemia. Neurologic:  Awake, motor nonfocal.      Hospital Course:   See admission H&P for full details of HPI. Patient admitted to medical bed for further evaluation. Blood cultures have remained negative and there has been no obvious evidence for acute infection. Patient has known history of dementia and there is nothing clinically to suggest that there is any other acute underlying process. PT/OT evals obtained and recommended disposition is to nursing facility. Patient is medically stable for transition today. Significant Diagnostic Studies:  CXR:  Impression:  1. No acute cardiopulmonary process. Discharge Medications:     Current Discharge Medication List      START taking these medications    Details   HYDROcodone-acetaminophen (NORCO) 5-325 mg per tablet Take 1 Tab by mouth every four (4) hours as needed. Max Daily Amount: 6 Tabs. Qty: 10 Tab, Refills: 0      !! multivit-min-FA-lut-zeaxanth 500-5-1 mcg-mg-mg tab Take 1 Tab by mouth two (2) times daily (with meals).   Qty: 10 Tab, Refills: 0      levothyroxine (SYNTHROID) 100 mcg tablet Take 1 Tab by mouth Daily (before breakfast). Qty: 30 Tab, Refills: 0      !! LORazepam (ATIVAN) 0.5 mg tablet Take 1 Tab by mouth every eight (8) hours as needed. Max Daily Amount: 1.5 mg.  Qty: 10 Tab, Refills: 0       !! - Potential duplicate medications found. Please discuss with provider. CONTINUE these medications which have NOT CHANGED    Details   CALCIUM CARBONATE (CALCIUM 500 PO) Take 500 mg by mouth daily. Indications: Vitamin D deficiency      OM3-DHA/EPA/D3/LUTEIN/ZEAZANTH (EYE OMEGA ADVANTAGE PO) Take 1,100 mg by mouth two (2) times a day. Indications: vision health      !! MULTIVIT-MIN/FA/LUTEIN/ZEAXANT (MACULAR VITAMIN PO) Take 10 mg by mouth two (2) times daily (with meals). Indications: eye health for macular degeneration      cyanocobalamin (VITAMIN B-12) 1,000 mcg tablet Take 1,000 mcg by mouth daily. timolol maleate 0.5 % drpd ophthalmic solution Administer 1 Drop to right eye daily. aspirin 81 mg chewable tablet Take 1 Tab by mouth daily. Qty: 30 Tab, Refills: 1      donepezil (ARICEPT) 10 mg tablet Take 10 mg by mouth nightly. 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 LORazepam (ATIVAN) 0.5 mg tablet Take 1 Tab by mouth nightly as needed. Max Daily Amount: 0.5 mg.  Qty: 10 Tab, Refills: 0       !! - Potential duplicate medications found. Please discuss with provider. STOP taking these medications       memantine (NAMENDA) 10 mg tablet Comments:   Reason for Stopping:               Activity: As tolerated    Diet: Cardiac Diet    Minutes spent on discharge: >30 minutes spent coordinating this discharge. Antonette Juarez.  Ave Console, MD  [facility]

## 2017-04-26 NOTE — PROGRESS NOTES
Late entry, 04/25/17. Patient was denied for long term at [facility] due to no bed available. Spoke to pt's daughter/POA-Jeremy Ruth via phone, she updated on progress with placement situation. She states, she needs some more time to review snf list, she will need to visit some facilities before she will make any decisions. Mrs. Ruth was informed that snf was provided to her last week when her mother was first admitted, and both daughters were encouraged to review facilities listed; I also informed both daughters that it will not be easy to find long term bed on pending [facility] [address] and asked to provide me with fewer places. Mrs. Ruth stated she bharti consider [facility] only if her mother will have semi-private room, but not a room where three patients live. She cassidy her verbal permission for me to contact [facility]. I called Rena Garcia, asked to review this pt's case, ltc bed on pending MDC. Mrs. Ruth also stated that her sister-Carlyn can make her input in regards to snfs. This AM I received voice mail from pt's other daughter-Carlyn, she also asked to make request for Joanna Badillo NP, [address] [place] [address]. Made request for above facilities. 11:36 AM patient was accepted to [address] [place] [address]. I called, L for admissions coordinator to confirm or the patient can go to their facility today. Received call from [facility] admissions coordinator, stating they will be able to accept the patient today for skilled care with progression to long term with pending [facility]; UAI/96 will be needed to [facility] today, max tomorrow; I informed pt's family request for semi-private room for TWO people, and was confirmed that this request will be satisfied. I called, spoke to pt's daughter-Jeremy, she agrees for her mother to transition to [place] today. Attending MD notified and will proceed with pt's discharge.      2:52 PM Med transport arranged with [facility],  time at 1600, or 4 PM. DC summary uploaded; transfer envelope ready in paper light chart.

## 2017-04-26 NOTE — PROGRESS NOTES
Problem: Self Care Deficits Care Plan (Adult)  Goal: *Acute Goals and Plan of Care (Insert Text)  Occupational Therapy Goals  Initiated 4/26/2017 within 7 day(s). 1. Patient will perform grooming with supervision/set-up. 2. Patient will perform upper body dressing with supervision/set-up. 3. Patient will perform lower body dressing with minimal assistance/contact guard assist.  4. Patient will perform toilet transfers with minimal assistance/contact guard assist.  5. Patient will participate in upper extremity therapeutic exercise/activities with supervision/set-up for 8 minutes to increase strength/endurance for ADLs. Outcome: Progressing Towards Goal  OCCUPATIONAL THERAPY EVALUATION     Patient: Myranda Mcclure (73 y.o. female)  Date: 4/26/2017  Primary Diagnosis: Hypothermia  Aspiration pneumonia (HCC)  Hypothermia  Encephalopathy acute        Precautions:   Fall      ASSESSMENT :  Based on the objective data described below, the patient presents with decreased ADLs, decreased functional mobility and muscle weakness, complicated by dementia. She has a supportive daughter at home who assists her prn but was able to complete self care tasks with supervision at her baseline. Mod to max assist required at this time for ADLs and functional transfers. Patient will benefit from skilled intervention to address the above impairments.   Patients rehabilitation potential is considered to be Good  Factors which may influence rehabilitation potential include:   [ ]             None noted  [ ]             Mental ability/status  [X]             Medical condition  [ ]             Home/family situation and support systems  [ ]             Safety awareness  [ ]             Pain tolerance/management  [ ]             Other:        PLAN :  Recommendations and Planned Interventions:  [X]               Self Care Training                  [X]        Therapeutic Activities  [X]               Functional Mobility Training    [ ]        Cognitive Retraining  [X]               Therapeutic Exercises           [X]        Endurance Activities  [X]               Balance Training                   [ ]        Neuromuscular Re-Education  [ ]               Visual/Perceptual Training     [X]   Home Safety Training  [X]               Patient Education                 [X]        Family Training/Education  [ ]               Other (comment):     Frequency/Duration: Patient will be followed by occupational therapy 1-2 times per day/4-7 days per week to address goals. Discharge Recommendations: Joaquín Quesada  Further Equipment Recommendations for Discharge: NA       PATIENT COMPLEXITY      Eval Complexity: History: MEDIUM Complexity : Expanded review of history including physical, cognitive and psychosocial  history ; Examination: HIGH Complexity : 5 or more performance deficits relating to physical, cognitive , or psychosocial skils that result in activity limitations and / or participation restrictions; Decision Making:HIGH Complexity : Patient presents with comorbidities that affect occupational performance. Signifigant modification of tasks or assistance (eg, physical or verbal) with assessment (s) is necessary to enable patient to complete evaluation  Assessment: Medium Complexity       G-CODES:      Self Care  Current  CL= 60-79%   Goal  CK= 40-59%. The severity rating is based on the Level of Assistance required for Functional Mobility and ADLs. SUBJECTIVE:   Patient stated I'm fine.       OBJECTIVE DATA SUMMARY:       Past Medical History:   Diagnosis Date    Neurological disorder       dementia    Pneumonia       Past Surgical History:   Procedure Laterality Date    HX APPENDECTOMY         Prior Level of Function/Home Situation: Pt required supervision with basic self care tasks and used a RW for functional mobility PTA.   Home Situation  Home Environment: Private residence  # Steps to Enter: [address] to Enter: Yes  Hand Rails : Bilateral  Wheelchair Ramp: No  One/Two Story Residence: One story  Living Alone: No  Support Systems: Family member(s)  Patient Expects to be Discharged to[de-identified] Private residence  Current DME Used/Available at Home: [address], rollator, Wheelchair, Shower chair, Commode, bedside, Walker, rolling, Grab bars  Tub or Shower Type: Shower (with seat)  [X]  Right hand dominant          [ ]  Left hand dominant  Cognitive/Behavioral Status:  Neurologic State: Alert  Orientation Level: Oriented to person;Oriented to place  Cognition: Follows commands  Safety/Judgement: Fall prevention     Skin: Intact on UEs     Edema: None noted in UEs     Vision/Perceptual:    Acuity:  (low vision)       Coordination:  Coordination: Generally decreased, functional  Fine Motor Skills-Upper: Left Intact; Right Intact    Gross Motor Skills-Upper: Left Intact; Right Intact     Balance:  Sitting: Intact; With support (with supervision)  Standing: Impaired; With support  Standing - Static: Poor  Standing - Dynamic : Poor     Strength:  Strength: Generally decreased, functional (UEs; 3+/5 throughout)     Tone & Sensation:  Sensation: Intact (UEs)     Range of Motion:  AROM: Generally decreased, functional (UEs)  PROM: Generally decreased, functional (UEs)     Functional Mobility and Transfers for ADLs:  Bed Mobility:  Supine to Sit: Minimum assistance  Scooting: Minimum assistance (extra time)  Transfers:  Sit to Stand: Moderate assistance;Assist x2              Toilet Transfer : Moderate assistance;Assist x2     ADL Assessment:  Feeding: Setup     Oral Facial Hygiene/Grooming: Minimum assistance     Bathing: Moderate assistance     Upper Body Dressing: Minimum assistance     Lower Body Dressing: Maximum assistance     Toileting: Maximum assistance     ADL Intervention:  Patient with bladder accident in bed. She required mod assist x2 to stand for hygiene and min assist for donning clean gown.     Cognitive Retraining  Safety/Judgement: Fall prevention     Pain:  Pt reports 0/10 pain or discomfort prior to treatment. Pt reports 0/10 pain or discomfort post treatment. Activity Tolerance:   Good     Please refer to the flowsheet for vital signs taken during this treatment. After treatment:   [ ] Patient left in no apparent distress sitting up in chair  [X] Patient left in no apparent distress in bed  [X] Call bell left within reach  [ ] Nursing notified  [X] Caregiver present  [ ] Bed alarm activated      COMMUNICATION/EDUCATION:   [X] Home safety education was provided and the patient/caregiver indicated understanding. [X] Patient/family have participated as able in goal setting and plan of care. [X] Patient/family agree to work toward stated goals and plan of care. [ ] Patient understands intent and goals of therapy, but is neutral about his/her participation. [ ] Patient is unable to participate in goal setting and plan of care.      Thank you for this referral.  Piyush Albarran MS OTR/L  Time Calculation: 25 mins

## 2017-04-26 NOTE — PROGRESS NOTES
1557:  Report called to THANIA Golden at [facility] of [address]. ETA ambulance 1600 today. 1620:  Discharged to SNF via ambulance. DNR status. Daughter at side.

## 2017-04-28 NOTE — PROGRESS NOTES
UAI/96 completed and submitted for processing. It can be found and retrieved at [facility] web site under ID # [id number]. UAI/96 faxed to [address] [place] [address], fax# [phone].

## 2021-06-10 NOTE — PROGRESS NOTES
Patient was denied by [facility] for long term placement on pending [address] [facility] [facility]. Called, spoke to pt's daughter/NAZ-Jeremy [phone], informed her about current situation on pt's placement and encouraged to review snf list provided earlier. She stated [address] [place] [address] as alternative facility. Will contact Daisha Cagle to inquire or patient can be place skilled with progression to long term. 10-Jacobo-2021 10:30